# Patient Record
Sex: MALE | URBAN - METROPOLITAN AREA
[De-identification: names, ages, dates, MRNs, and addresses within clinical notes are randomized per-mention and may not be internally consistent; named-entity substitution may affect disease eponyms.]

---

## 2018-07-02 ENCOUNTER — APPOINTMENT (RX ONLY)
Dept: URBAN - METROPOLITAN AREA CLINIC 36 | Facility: CLINIC | Age: 53
Setting detail: DERMATOLOGY
End: 2018-07-02

## 2018-07-02 DIAGNOSIS — D485 NEOPLASM OF UNCERTAIN BEHAVIOR OF SKIN: ICD-10-CM

## 2018-07-02 DIAGNOSIS — L20.89 OTHER ATOPIC DERMATITIS: ICD-10-CM

## 2018-07-02 DIAGNOSIS — D22 MELANOCYTIC NEVI: ICD-10-CM

## 2018-07-02 DIAGNOSIS — L82.1 OTHER SEBORRHEIC KERATOSIS: ICD-10-CM

## 2018-07-02 DIAGNOSIS — D18.0 HEMANGIOMA: ICD-10-CM

## 2018-07-02 DIAGNOSIS — L81.4 OTHER MELANIN HYPERPIGMENTATION: ICD-10-CM

## 2018-07-02 PROBLEM — D22.9 MELANOCYTIC NEVI, UNSPECIFIED: Status: ACTIVE | Noted: 2018-07-02

## 2018-07-02 PROBLEM — D18.01 HEMANGIOMA OF SKIN AND SUBCUTANEOUS TISSUE: Status: ACTIVE | Noted: 2018-07-02

## 2018-07-02 PROBLEM — L20.84 INTRINSIC (ALLERGIC) ECZEMA: Status: ACTIVE | Noted: 2018-07-02

## 2018-07-02 PROBLEM — D48.5 NEOPLASM OF UNCERTAIN BEHAVIOR OF SKIN: Status: ACTIVE | Noted: 2018-07-02

## 2018-07-02 PROCEDURE — 11101: CPT

## 2018-07-02 PROCEDURE — 99202 OFFICE O/P NEW SF 15 MIN: CPT | Mod: 25

## 2018-07-02 PROCEDURE — ? COUNSELING

## 2018-07-02 PROCEDURE — ? COUNSELING: TOPICAL STEROIDS

## 2018-07-02 PROCEDURE — ? BIOPSY BY SHAVE METHOD

## 2018-07-02 PROCEDURE — ? PRESCRIPTION

## 2018-07-02 PROCEDURE — 11100: CPT

## 2018-07-02 RX ORDER — CLOBETASOL PROPIONATE 0.5 MG/G
CREAM TOPICAL
Qty: 1 | Refills: 11 | Status: ERX | COMMUNITY
Start: 2018-07-02

## 2018-07-02 RX ADMIN — CLOBETASOL PROPIONATE: 0.5 CREAM TOPICAL at 00:00

## 2018-07-02 ASSESSMENT — LOCATION SIMPLE DESCRIPTION DERM
LOCATION SIMPLE: ABDOMEN
LOCATION SIMPLE: RIGHT LOWER BACK
LOCATION SIMPLE: LEFT FOREARM
LOCATION SIMPLE: RIGHT FOREARM
LOCATION SIMPLE: RIGHT CHEEK
LOCATION SIMPLE: CHEST
LOCATION SIMPLE: LEFT UPPER BACK

## 2018-07-02 ASSESSMENT — LOCATION DETAILED DESCRIPTION DERM
LOCATION DETAILED: LEFT SUPERIOR MEDIAL UPPER BACK
LOCATION DETAILED: RIGHT MEDIAL SUPERIOR CHEST
LOCATION DETAILED: EPIGASTRIC SKIN
LOCATION DETAILED: LEFT INFERIOR MEDIAL UPPER BACK
LOCATION DETAILED: STERNAL NOTCH
LOCATION DETAILED: SUBXIPHOID
LOCATION DETAILED: LEFT DISTAL DORSAL FOREARM
LOCATION DETAILED: RIGHT INFERIOR MEDIAL MIDBACK
LOCATION DETAILED: RIGHT VENTRAL PROXIMAL FOREARM
LOCATION DETAILED: RIGHT SUPERIOR NASAL CHEEK
LOCATION DETAILED: LEFT VENTRAL LATERAL PROXIMAL FOREARM

## 2018-07-02 ASSESSMENT — LOCATION ZONE DERM
LOCATION ZONE: TRUNK
LOCATION ZONE: ARM
LOCATION ZONE: FACE

## 2018-07-02 NOTE — PROCEDURE: BIOPSY BY SHAVE METHOD
Anesthesia Volume In Cc: 0.5
Lab: [id number]
Additional Anesthesia Volume In Cc (Will Not Render If 0): 0
Bill For Surgical Tray: no
Silver Nitrate Text: The wound bed was treated with silver nitrate after the biopsy was performed.
Render Post-Care Instructions In Note?: yes
Post-Care Instructions: I reviewed with the patient in detail post-care instructions. Patient is to keep the biopsy site dry overnight, and then apply bacitracin twice daily until healed. Patient may apply hydrogen peroxide soaks to remove any crusting.
Billing Type: Third-Party Bill
Consent: Written consent was obtained and risks were reviewed including but not limited to scarring, infection, bleeding, scabbing, incomplete removal, nerve damage and allergy to anesthesia.
Detail Level: Detailed
Electrodesiccation Text: The wound bed was treated with electrodesiccation after the biopsy was performed.
Cryotherapy Text: The wound bed was treated with cryotherapy after the biopsy was performed.
Biopsy Method: Personna blade
Biopsy Type: H and E
Anesthesia Type: 1% lidocaine with epinephrine
Dressing: bandage
Notification Instructions: Patient will be notified of biopsy results. However, patient instructed to call the office if not contacted within 2 weeks.
Electrodesiccation And Curettage Text: The wound bed was treated with electrodesiccation and curettage after the biopsy was performed.
Curettage Text: The wound bed was treated with curettage after the biopsy was performed.
Hemostasis: Drysol
Lab Facility: 
Depth Of Biopsy: dermis
Type Of Destruction Used: Curettage
Wound Care: Vaseline

## 2018-07-02 NOTE — HPI: SECONDARY COMPLAINT
How Severe Is This Condition?: moderate
Additional History: Worse in the winter time. Also has some scaling on his left axilla

## 2018-07-19 ENCOUNTER — APPOINTMENT (RX ONLY)
Dept: URBAN - METROPOLITAN AREA CLINIC 36 | Facility: CLINIC | Age: 53
Setting detail: DERMATOLOGY
End: 2018-07-19

## 2018-07-19 DIAGNOSIS — L57.0 ACTINIC KERATOSIS: ICD-10-CM

## 2018-07-19 DIAGNOSIS — D22 MELANOCYTIC NEVI: ICD-10-CM

## 2018-07-19 PROBLEM — D22.5 MELANOCYTIC NEVI OF TRUNK: Status: ACTIVE | Noted: 2018-07-19

## 2018-07-19 PROBLEM — Z85.828 PERSONAL HISTORY OF OTHER MALIGNANT NEOPLASM OF SKIN: Status: ACTIVE | Noted: 2018-07-19

## 2018-07-19 PROBLEM — C44.519 BASAL CELL CARCINOMA OF SKIN OF OTHER PART OF TRUNK: Status: ACTIVE | Noted: 2018-07-19

## 2018-07-19 PROCEDURE — ? COUNSELING

## 2018-07-19 PROCEDURE — 17260 DSTRJ MAL LES T/A/L 0.5 CM/<: CPT

## 2018-07-19 PROCEDURE — 11402 EXC TR-EXT B9+MARG 1.1-2 CM: CPT

## 2018-07-19 PROCEDURE — 99212 OFFICE O/P EST SF 10 MIN: CPT | Mod: 25

## 2018-07-19 PROCEDURE — ? EXCISION

## 2018-07-19 PROCEDURE — ? CURETTAGE AND DESTRUCTION

## 2018-07-19 ASSESSMENT — LOCATION SIMPLE DESCRIPTION DERM
LOCATION SIMPLE: RIGHT LOWER BACK
LOCATION SIMPLE: RIGHT LOWER BACK

## 2018-07-19 ASSESSMENT — LOCATION DETAILED DESCRIPTION DERM
LOCATION DETAILED: RIGHT INFERIOR MEDIAL MIDBACK
LOCATION DETAILED: RIGHT INFERIOR MEDIAL MIDBACK

## 2018-07-19 ASSESSMENT — LOCATION ZONE DERM
LOCATION ZONE: TRUNK
LOCATION ZONE: TRUNK

## 2018-07-19 NOTE — PROCEDURE: CURETTAGE AND DESTRUCTION
Size Of Lesion After Curettage: 0.5
Number Of Curettages: 3
What Was Performed First?: Curettage
Bill For Surgical Tray: no
Detail Level: Detailed
Cautery Type: electrodesiccation
Concentration (Mg/Ml Or Millions Of Plaque Forming Units/Cc): 0.01
Additional Information: (Optional): The wound was cleaned, and a pressure dressing was applied.  The patient received detailed post-op instructions.
Anesthesia Type: 1% lidocaine with epinephrine
Total Volume (Ccs): 1
Bill As A Line Item Or As Units: Line Item
Post-Care Instructions: I reviewed with the patient in detail post-care instructions. Patient is to keep the area dry for 48 hours, and not to engage in any swimming until the area is healed. Should the patient develop any fevers, chills, bleeding, severe pain patient will contact the office immediately.
Consent was obtained from the patient. The risks, benefits and alternatives to therapy were discussed in detail and opted to change to EDC from mohs given the small size of this lesion on curretting for first layer of mohs. Specifically, the risks of infection, scarring, bleeding, prolonged wound healing, nerve injury, incomplete removal, allergy to anesthesia and recurrence were addressed. Alternatives to ED&C, such as: surgical removal and Mohs also discussed.  Prior to the procedure, the treatment site was clearly identified and confirmed by the patient. All components of Universal Protocol/PAUSE Rule completed.
Size Of Lesion In Cm: 0.3

## 2018-07-19 NOTE — PROCEDURE: COUNSELING
Detail Level: Detailed
Patient Specific Counseling (Will Not Stick From Patient To Patient): consider full face fraxel in the fall

## 2018-07-19 NOTE — PROCEDURE: EXCISION
Complex Repair And W Plasty Text: The defect edges were debeveled with a #15 scalpel blade.  The primary defect was closed partially with a complex linear closure.  Given the location of the remaining defect, shape of the defect and the proximity to free margins a W plasty was deemed most appropriate for complete closure of the defect.  Using a sterile surgical marker, an appropriate advancement flap was drawn incorporating the defect and placing the expected incisions within the relaxed skin tension lines where possible.    The area thus outlined was incised deep to adipose tissue with a #15 scalpel blade.  The skin margins were undermined to an appropriate distance in all directions utilizing iris scissors.
Complex Repair And Double Advancement Flap Text: The defect edges were debeveled with a #15 scalpel blade.  The primary defect was closed partially with a complex linear closure.  Given the location of the remaining defect, shape of the defect and the proximity to free margins a double advancement flap was deemed most appropriate for complete closure of the defect.  Using a sterile surgical marker, an appropriate advancement flap was drawn incorporating the defect and placing the expected incisions within the relaxed skin tension lines where possible.    The area thus outlined was incised deep to adipose tissue with a #15 scalpel blade.  The skin margins were undermined to an appropriate distance in all directions utilizing iris scissors.
Dermal Autograft Text: The defect edges were debeveled with a #15 scalpel blade.  Given the location of the defect, shape of the defect and the proximity to free margins a dermal autograft was deemed most appropriate.  Using a sterile surgical marker, the primary defect shape was transferred to the donor site. The area thus outlined was incised deep to adipose tissue with a #15 scalpel blade.  The harvested graft was then trimmed of adipose and epidermal tissue until only dermis was left.  The skin graft was then placed in the primary defect and oriented appropriately.
Crescentic Advancement Flap Text: The defect edges were debeveled with a #15 scalpel blade.  Given the location of the defect and the proximity to free margins a crescentic advancement flap was deemed most appropriate.  Using a sterile surgical marker, the appropriate advancement flap was drawn incorporating the defect and placing the expected incisions within the relaxed skin tension lines where possible.    The area thus outlined was incised deep to adipose tissue with a #15 scalpel blade.  The skin margins were undermined to an appropriate distance in all directions utilizing iris scissors.
Complex Repair And O-L Flap Text: The defect edges were debeveled with a #15 scalpel blade.  The primary defect was closed partially with a complex linear closure.  Given the location of the remaining defect, shape of the defect and the proximity to free margins an O-L flap was deemed most appropriate for complete closure of the defect.  Using a sterile surgical marker, an appropriate flap was drawn incorporating the defect and placing the expected incisions within the relaxed skin tension lines where possible.    The area thus outlined was incised deep to adipose tissue with a #15 scalpel blade.  The skin margins were undermined to an appropriate distance in all directions utilizing iris scissors.
S Plasty Text: Given the location and shape of the defect, and the orientation of relaxed skin tension lines, an S-plasty was deemed most appropriate for repair.  Using a sterile surgical marker, the appropriate outline of the S-plasty was drawn, incorporating the defect and placing the expected incisions within the relaxed skin tension lines where possible.  The area thus outlined was incised deep to adipose tissue with a #15 scalpel blade.  The skin margins were undermined to an appropriate distance in all directions utilizing iris scissors. The skin flaps were advanced over the defect.  The opposing margins were then approximated with interrupted buried subcutaneous sutures.
Show Accession Variable: Yes
Post-Care Instructions: I reviewed with the patient in detail post-care instructions. Patient is not to engage in any heavy lifting, exercise, or swimming for the next 14 days. Should the patient develop any fevers, chills, bleeding, severe pain patient will contact the office immediately.
O-Z Plasty Text: The defect edges were debeveled with a #15 scalpel blade.  Given the location of the defect, shape of the defect and the proximity to free margins an O-Z plasty (double transposition flap) was deemed most appropriate.  Using a sterile surgical marker, the appropriate transposition flaps were drawn incorporating the defect and placing the expected incisions within the relaxed skin tension lines where possible.    The area thus outlined was incised deep to adipose tissue with a #15 scalpel blade.  The skin margins were undermined to an appropriate distance in all directions utilizing iris scissors.  Hemostasis was achieved with electrocautery.  The flaps were then transposed into place, one clockwise and the other counterclockwise, and anchored with interrupted buried subcutaneous sutures.
Excisional Biopsy Additional Text (Leave Blank If You Do Not Want): The margin was drawn around the clinically apparent lesion. An elliptical shape was then drawn on the skin incorporating the lesion and margins.  Incisions were then made along these lines to the appropriate tissue plane and the lesion was extirpated.
Complex Repair And Transposition Flap Text: The defect edges were debeveled with a #15 scalpel blade.  The primary defect was closed partially with a complex linear closure.  Given the location of the remaining defect, shape of the defect and the proximity to free margins a transposition flap was deemed most appropriate for complete closure of the defect.  Using a sterile surgical marker, an appropriate advancement flap was drawn incorporating the defect and placing the expected incisions within the relaxed skin tension lines where possible.    The area thus outlined was incised deep to adipose tissue with a #15 scalpel blade.  The skin margins were undermined to an appropriate distance in all directions utilizing iris scissors.
Graft Donor Site Bandage (Optional-Leave Blank If You Don't Want In Note): Steri-strips and a pressure bandage were applied to the donor site.
Cheek-To-Nose Interpolation Flap Text: A decision was made to reconstruct the defect utilizing an interpolation axial flap and a staged reconstruction.  A telfa template was made of the defect.  This telfa template was then used to outline the Cheek-To-Nose Interpolation flap.  The donor area for the pedicle flap was then injected with anesthesia.  The flap was excised through the skin and subcutaneous tissue down to the layer of the underlying musculature.  The interpolation flap was carefully excised within this deep plane to maintain its blood supply.  The edges of the donor site were undermined.   The donor site was closed in a primary fashion.  The pedicle was then rotated into position and sutured.  Once the tube was sutured into place, adequate blood supply was confirmed with blanching and refill.  The pedicle was then wrapped with xeroform gauze and dressed appropriately with a telfa and gauze bandage to ensure continued blood supply and protect the attached pedicle.
Muscle Hinge Flap Text: The defect edges were debeveled with a #15 scalpel blade.  Given the size, depth and location of the defect and the proximity to free margins a muscle hinge flap was deemed most appropriate.  Using a sterile surgical marker, an appropriate hinge flap was drawn incorporating the defect. The area thus outlined was incised with a #15 scalpel blade.  The skin margins were undermined to an appropriate distance in all directions utilizing iris scissors.
Intermediate Repair Preamble Text (Leave Blank If You Do Not Want): Undermining was performed with blunt dissection.
Secondary Defect Width (In Cm): 0
Complex Repair And Double M Plasty Text: The defect edges were debeveled with a #15 scalpel blade.  The primary defect was closed partially with a complex linear closure.  Given the location of the remaining defect, shape of the defect and the proximity to free margins a double M plasty was deemed most appropriate for complete closure of the defect.  Using a sterile surgical marker, an appropriate advancement flap was drawn incorporating the defect and placing the expected incisions within the relaxed skin tension lines where possible.    The area thus outlined was incised deep to adipose tissue with a #15 scalpel blade.  The skin margins were undermined to an appropriate distance in all directions utilizing iris scissors.
Rhombic Flap Text: The defect edges were debeveled with a #15 scalpel blade.  Given the location of the defect and the proximity to free margins a rhombic flap was deemed most appropriate.  Using a sterile surgical marker, an appropriate rhombic flap was drawn incorporating the defect.    The area thus outlined was incised deep to adipose tissue with a #15 scalpel blade.  The skin margins were undermined to an appropriate distance in all directions utilizing iris scissors.
Epidermal Sutures: 4-0 Ethilon
Scalpel Size: 15 blade
Cartilage Graft Text: The defect edges were debeveled with a #15 scalpel blade.  Given the location of the defect, shape of the defect, the fact the defect involved a full thickness cartilage defect a cartilage graft was deemed most appropriate.  An appropriate donor site was identified, cleansed, and anesthetized. The cartilage graft was then harvested and transferred to the recipient site, oriented appropriately and then sutured into place.  The secondary defect was then repaired using a primary closure.
Complex Repair And Single Advancement Flap Text: The defect edges were debeveled with a #15 scalpel blade.  The primary defect was closed partially with a complex linear closure.  Given the location of the remaining defect, shape of the defect and the proximity to free margins a single advancement flap was deemed most appropriate for complete closure of the defect.  Using a sterile surgical marker, an appropriate advancement flap was drawn incorporating the defect and placing the expected incisions within the relaxed skin tension lines where possible.    The area thus outlined was incised deep to adipose tissue with a #15 scalpel blade.  The skin margins were undermined to an appropriate distance in all directions utilizing iris scissors.
Add Nub Associated Diagnoses If Applicable When Selecting Medical Necessity: No
Advancement Flap (Single) Text: The defect edges were debeveled with a #15 scalpel blade.  Given the location of the defect and the proximity to free margins a single advancement flap was deemed most appropriate.  Using a sterile surgical marker, an appropriate advancement flap was drawn incorporating the defect and placing the expected incisions within the relaxed skin tension lines where possible.    The area thus outlined was incised deep to adipose tissue with a #15 scalpel blade.  The skin margins were undermined to an appropriate distance in all directions utilizing iris scissors.
Complex Repair And Dermal Autograft Text: The defect edges were debeveled with a #15 scalpel blade.  The primary defect was closed partially with a complex linear closure.  Given the location of the defect, shape of the defect and the proximity to free margins an dermal autograft was deemed most appropriate to repair the remaining defect.  The graft was trimmed to fit the size of the remaining defect.  The graft was then placed in the primary defect, oriented appropriately, and sutured into place.
Complex Repair And Epidermal Autograft Text: The defect edges were debeveled with a #15 scalpel blade.  The primary defect was closed partially with a complex linear closure.  Given the location of the defect, shape of the defect and the proximity to free margins an epidermal autograft was deemed most appropriate to repair the remaining defect.  The graft was trimmed to fit the size of the remaining defect.  The graft was then placed in the primary defect, oriented appropriately, and sutured into place.
Medical Necessity Clause: This procedure was medically necessary because the lesion that was treated was:
Consent was obtained from the patient. The risks and benefits to therapy were discussed in detail. Specifically, the risks of infection, scarring, bleeding, prolonged wound healing, incomplete removal, allergy to anesthesia, nerve injury and recurrence were addressed. Prior to the procedure, the treatment site was clearly identified and confirmed by the patient. All components of Universal Protocol/PAUSE Rule completed.
Complex Repair And Modified Advancement Flap Text: The defect edges were debeveled with a #15 scalpel blade.  The primary defect was closed partially with a complex linear closure.  Given the location of the remaining defect, shape of the defect and the proximity to free margins a modified advancement flap was deemed most appropriate for complete closure of the defect.  Using a sterile surgical marker, an appropriate advancement flap was drawn incorporating the defect and placing the expected incisions within the relaxed skin tension lines where possible.    The area thus outlined was incised deep to adipose tissue with a #15 scalpel blade.  The skin margins were undermined to an appropriate distance in all directions utilizing iris scissors.
Bilobed Flap Text: The defect edges were debeveled with a #15 scalpel blade.  Given the location of the defect and the proximity to free margins a bilobe flap was deemed most appropriate.  Using a sterile surgical marker, an appropriate bilobe flap drawn around the defect.    The area thus outlined was incised deep to adipose tissue with a #15 scalpel blade.  The skin margins were undermined to an appropriate distance in all directions utilizing iris scissors.
Complex Repair And Rhombic Flap Text: The defect edges were debeveled with a #15 scalpel blade.  The primary defect was closed partially with a complex linear closure.  Given the location of the remaining defect, shape of the defect and the proximity to free margins a rhombic flap was deemed most appropriate for complete closure of the defect.  Using a sterile surgical marker, an appropriate advancement flap was drawn incorporating the defect and placing the expected incisions within the relaxed skin tension lines where possible.    The area thus outlined was incised deep to adipose tissue with a #15 scalpel blade.  The skin margins were undermined to an appropriate distance in all directions utilizing iris scissors.
Hemostasis: Electrocautery
Complex Repair And Tissue Cultured Epidermal Autograft Text: The defect edges were debeveled with a #15 scalpel blade.  The primary defect was closed partially with a complex linear closure.  Given the location of the defect, shape of the defect and the proximity to free margins an tissue cultured epidermal autograft was deemed most appropriate to repair the remaining defect.  The graft was trimmed to fit the size of the remaining defect.  The graft was then placed in the primary defect, oriented appropriately, and sutured into place.
V-Y Plasty Text: The defect edges were debeveled with a #15 scalpel blade.  Given the location of the defect, shape of the defect and the proximity to free margins an V-Y advancement flap was deemed most appropriate.  Using a sterile surgical marker, an appropriate advancement flap was drawn incorporating the defect and placing the expected incisions within the relaxed skin tension lines where possible.    The area thus outlined was incised deep to adipose tissue with a #15 scalpel blade.  The skin margins were undermined to an appropriate distance in all directions utilizing iris scissors.
Path Notes (To The Dermatopathologist): Please check margins.
Advancement Flap (Double) Text: The defect edges were debeveled with a #15 scalpel blade.  Given the location of the defect and the proximity to free margins a double advancement flap was deemed most appropriate.  Using a sterile surgical marker, the appropriate advancement flaps were drawn incorporating the defect and placing the expected incisions within the relaxed skin tension lines where possible.    The area thus outlined was incised deep to adipose tissue with a #15 scalpel blade.  The skin margins were undermined to an appropriate distance in all directions utilizing iris scissors.
Skin Substitute Text: The defect edges were debeveled with a #15 scalpel blade.  Given the location of the defect, shape of the defect and the proximity to free margins a skin substitute graft was deemed most appropriate.  The graft material was trimmed to fit the size of the defect. The graft was then placed in the primary defect and oriented appropriately.
Complex Repair And Bilobe Flap Text: The defect edges were debeveled with a #15 scalpel blade.  The primary defect was closed partially with a complex linear closure.  Given the location of the remaining defect, shape of the defect and the proximity to free margins a bilobe flap was deemed most appropriate for complete closure of the defect.  Using a sterile surgical marker, an appropriate advancement flap was drawn incorporating the defect and placing the expected incisions within the relaxed skin tension lines where possible.    The area thus outlined was incised deep to adipose tissue with a #15 scalpel blade.  The skin margins were undermined to an appropriate distance in all directions utilizing iris scissors.
Complex Repair And O-T Advancement Flap Text: The defect edges were debeveled with a #15 scalpel blade.  The primary defect was closed partially with a complex linear closure.  Given the location of the remaining defect, shape of the defect and the proximity to free margins an O-T advancement flap was deemed most appropriate for complete closure of the defect.  Using a sterile surgical marker, an appropriate advancement flap was drawn incorporating the defect and placing the expected incisions within the relaxed skin tension lines where possible.    The area thus outlined was incised deep to adipose tissue with a #15 scalpel blade.  The skin margins were undermined to an appropriate distance in all directions utilizing iris scissors.
Island Pedicle Flap With Canthal Suspension Text: The defect edges were debeveled with a #15 scalpel blade.  Given the location of the defect, shape of the defect and the proximity to free margins an island pedicle advancement flap was deemed most appropriate.  Using a sterile surgical marker, an appropriate advancement flap was drawn incorporating the defect, outlining the appropriate donor tissue and placing the expected incisions within the relaxed skin tension lines where possible. The area thus outlined was incised deep to adipose tissue with a #15 scalpel blade.  The skin margins were undermined to an appropriate distance in all directions around the primary defect and laterally outward around the island pedicle utilizing iris scissors.  There was minimal undermining beneath the pedicle flap. A suspension suture was placed in the canthal tendon to prevent tension and prevent ectropion.
Complex Repair And M Plasty Text: The defect edges were debeveled with a #15 scalpel blade.  The primary defect was closed partially with a complex linear closure.  Given the location of the remaining defect, shape of the defect and the proximity to free margins an M plasty was deemed most appropriate for complete closure of the defect.  Using a sterile surgical marker, an appropriate advancement flap was drawn incorporating the defect and placing the expected incisions within the relaxed skin tension lines where possible.    The area thus outlined was incised deep to adipose tissue with a #15 scalpel blade.  The skin margins were undermined to an appropriate distance in all directions utilizing iris scissors.
Mucosal Advancement Flap Text: Given the location of the defect, shape of the defect and the proximity to free margins a mucosal advancement flap was deemed most appropriate. Incisions were made with a 15 blade scalpel in the appropriate fashion along the cutaneous vermillion border and the mucosal lip. The remaining actinically damaged mucosal tissue was excised.  The mucosal advancement flap was then elevated to the gingival sulcus with care taken to preserve the neurovascular structures and advanced into the primary defect. Care was taken to ensure that precise realignment of the vermilion border was achieved.
V-Y Flap Text: The defect edges were debeveled with a #15 scalpel blade.  Given the location of the defect, shape of the defect and the proximity to free margins a V-Y flap was deemed most appropriate.  Using a sterile surgical marker, an appropriate advancement flap was drawn incorporating the defect and placing the expected incisions within the relaxed skin tension lines where possible.    The area thus outlined was incised deep to adipose tissue with a #15 scalpel blade.  The skin margins were undermined to an appropriate distance in all directions utilizing iris scissors.
Lab: [id number]
Melolabial Transposition Flap Text: The defect edges were debeveled with a #15 scalpel blade.  Given the location of the defect and the proximity to free margins a melolabial flap was deemed most appropriate.  Using a sterile surgical marker, an appropriate melolabial transposition flap was drawn incorporating the defect.    The area thus outlined was incised deep to adipose tissue with a #15 scalpel blade.  The skin margins were undermined to an appropriate distance in all directions utilizing iris scissors.
Lazy S Complex Repair Preamble Text (Leave Blank If You Do Not Want): Extensive wide undermining was performed.
Dressing: dry sterile dressing
Home Suture Removal Text: Patient was provided a home suture removal kit and will remove their sutures at home.  If they have any questions or difficulties they will call the office.
Dorsal Nasal Flap Text: The defect edges were debeveled with a #15 scalpel blade.  Given the location of the defect and the proximity to free margins a dorsal nasal flap was deemed most appropriate.  Using a sterile surgical marker, an appropriate dorsal nasal flap was drawn around the defect.    The area thus outlined was incised deep to adipose tissue with a #15 scalpel blade.  The skin margins were undermined to an appropriate distance in all directions utilizing iris scissors.
Slit Excision Additional Text (Leave Blank If You Do Not Want): A linear line was drawn on the skin overlying the lesion. An incision was made slowly until the lesion was visualized.  Once visualized, the lesion was removed with blunt dissection.
Spiral Flap Text: The defect edges were debeveled with a #15 scalpel blade.  Given the location of the defect, shape of the defect and the proximity to free margins a spiral flap was deemed most appropriate.  Using a sterile surgical marker, an appropriate rotation flap was drawn incorporating the defect and placing the expected incisions within the relaxed skin tension lines where possible. The area thus outlined was incised deep to adipose tissue with a #15 scalpel blade.  The skin margins were undermined to an appropriate distance in all directions utilizing iris scissors.
Cheek Interpolation Flap Text: A decision was made to reconstruct the defect utilizing an interpolation axial flap and a staged reconstruction.  A telfa template was made of the defect.  This telfa template was then used to outline the Cheek Interpolation flap.  The donor area for the pedicle flap was then injected with anesthesia.  The flap was excised through the skin and subcutaneous tissue down to the layer of the underlying musculature.  The interpolation flap was carefully excised within this deep plane to maintain its blood supply.  The edges of the donor site were undermined.   The donor site was closed in a primary fashion.  The pedicle was then rotated into position and sutured.  Once the tube was sutured into place, adequate blood supply was confirmed with blanching and refill.  The pedicle was then wrapped with xeroform gauze and dressed appropriately with a telfa and gauze bandage to ensure continued blood supply and protect the attached pedicle.
Island Pedicle Flap Text: The defect edges were debeveled with a #15 scalpel blade.  Given the location of the defect, shape of the defect and the proximity to free margins an island pedicle advancement flap was deemed most appropriate.  Using a sterile surgical marker, an appropriate advancement flap was drawn incorporating the defect, outlining the appropriate donor tissue and placing the expected incisions within the relaxed skin tension lines where possible.    The area thus outlined was incised deep to adipose tissue with a #15 scalpel blade.  The skin margins were undermined to an appropriate distance in all directions around the primary defect and laterally outward around the island pedicle utilizing iris scissors.  There was minimal undermining beneath the pedicle flap.
Purse String (Simple) Text: Given the location of the defect and the characteristics of the surrounding skin a purse string simple closure was deemed most appropriate.  Undermining was performed circumferentially around the surgical defect.  A purse string suture was then placed and tightened.
Repair Performed By Another Provider Text (Leave Blank If You Do Not Want): After the tissue was excised the defect was repaired by another provider.
Complex Repair And Ftsg Text: The defect edges were debeveled with a #15 scalpel blade.  The primary defect was closed partially with a complex linear closure.  Given the location of the defect, shape of the defect and the proximity to free margins a full thickness skin graft was deemed most appropriate to repair the remaining defect.  The graft was trimmed to fit the size of the remaining defect.  The graft was then placed in the primary defect, oriented appropriately, and sutured into place.
Complex Repair And A-T Advancement Flap Text: The defect edges were debeveled with a #15 scalpel blade.  The primary defect was closed partially with a complex linear closure.  Given the location of the remaining defect, shape of the defect and the proximity to free margins an A-T advancement flap was deemed most appropriate for complete closure of the defect.  Using a sterile surgical marker, an appropriate advancement flap was drawn incorporating the defect and placing the expected incisions within the relaxed skin tension lines where possible.    The area thus outlined was incised deep to adipose tissue with a #15 scalpel blade.  The skin margins were undermined to an appropriate distance in all directions utilizing iris scissors.
Posterior Auricular Interpolation Flap Text: A decision was made to reconstruct the defect utilizing an interpolation axial flap and a staged reconstruction.  A telfa template was made of the defect.  This telfa template was then used to outline the posterior auricular interpolation flap.  The donor area for the pedicle flap was then injected with anesthesia.  The flap was excised through the skin and subcutaneous tissue down to the layer of the underlying musculature.  The pedicle flap was carefully excised within this deep plane to maintain its blood supply.  The edges of the donor site were undermined.   The donor site was closed in a primary fashion.  The pedicle was then rotated into position and sutured.  Once the tube was sutured into place, adequate blood supply was confirmed with blanching and refill.  The pedicle was then wrapped with xeroform gauze and dressed appropriately with a telfa and gauze bandage to ensure continued blood supply and protect the attached pedicle.
Complex Repair And Split-Thickness Skin Graft Text: The defect edges were debeveled with a #15 scalpel blade.  The primary defect was closed partially with a complex linear closure.  Given the location of the defect, shape of the defect and the proximity to free margins a split thickness skin graft was deemed most appropriate to repair the remaining defect.  The graft was trimmed to fit the size of the remaining defect.  The graft was then placed in the primary defect, oriented appropriately, and sutured into place.
Paramedian Forehead Flap Text: A decision was made to reconstruct the defect utilizing an interpolation axial flap and a staged reconstruction.  A telfa template was made of the defect.  This telfa template was then used to outline the paramedian forehead pedicle flap.  The donor area for the pedicle flap was then injected with anesthesia.  The flap was excised through the skin and subcutaneous tissue down to the layer of the underlying musculature.  The pedicle flap was carefully excised within this deep plane to maintain its blood supply.  The edges of the donor site were undermined.   The donor site was closed in a primary fashion.  The pedicle was then rotated into position and sutured.  Once the tube was sutured into place, adequate blood supply was confirmed with blanching and refill.  The pedicle was then wrapped with xeroform gauze and dressed appropriately with a telfa and gauze bandage to ensure continued blood supply and protect the attached pedicle.
Modified Advancement Flap Text: The defect edges were debeveled with a #15 scalpel blade.  Given the location of the defect, shape of the defect and the proximity to free margins a modified advancement flap was deemed most appropriate.  Using a sterile surgical marker, an appropriate advancement flap was drawn incorporating the defect and placing the expected incisions within the relaxed skin tension lines where possible.    The area thus outlined was incised deep to adipose tissue with a #15 scalpel blade.  The skin margins were undermined to an appropriate distance in all directions utilizing iris scissors.
Size Of Lesion In Cm: 1
Composite Graft Text: The defect edges were debeveled with a #15 scalpel blade.  Given the location of the defect, shape of the defect, the proximity to free margins and the fact the defect was full thickness a composite graft was deemed most appropriate.  The defect was outline and then transferred to the donor site.  A full thickness graft was then excised from the donor site. The graft was then placed in the primary defect, oriented appropriately and then sutured into place.  The secondary defect was then repaired using a primary closure.
Complex Repair And Melolabial Flap Text: The defect edges were debeveled with a #15 scalpel blade.  The primary defect was closed partially with a complex linear closure.  Given the location of the remaining defect, shape of the defect and the proximity to free margins a melolabial flap was deemed most appropriate for complete closure of the defect.  Using a sterile surgical marker, an appropriate advancement flap was drawn incorporating the defect and placing the expected incisions within the relaxed skin tension lines where possible.    The area thus outlined was incised deep to adipose tissue with a #15 scalpel blade.  The skin margins were undermined to an appropriate distance in all directions utilizing iris scissors.
Ear Star Wedge Flap Text: The defect edges were debeveled with a #15 blade scalpel.  Given the location of the defect and the proximity to free margins (helical rim) an ear star wedge flap was deemed most appropriate.  Using a sterile surgical marker, the appropriate flap was drawn incorporating the defect and placing the expected incisions between the helical rim and antihelix where possible.  The area thus outlined was incised through and through with a #15 scalpel blade.
Billing Type: Third-Party Bill
Anesthesia Type: 1% lidocaine with epinephrine
Helical Rim Advancement Flap Text: The defect edges were debeveled with a #15 blade scalpel.  Given the location of the defect and the proximity to free margins (helical rim) a double helical rim advancement flap was deemed most appropriate.  Using a sterile surgical marker, the appropriate advancement flaps were drawn incorporating the defect and placing the expected incisions between the helical rim and antihelix where possible.  The area thus outlined was incised through and through with a #15 scalpel blade.  With a skin hook and iris scissors, the flaps were gently and sharply undermined and freed up.
Eliptical Excision Additional Text (Leave Blank If You Do Not Want): The margin was drawn around the clinically apparent lesion.  An elliptical shape was then drawn on the skin incorporating the lesion and margins.  Incisions were then made along these lines to the appropriate tissue plane and the lesion was extirpated.
Purse String (Intermediate) Text: Given the location of the defect and the characteristics of the surrounding skin a purse string intermediate closure was deemed most appropriate.  Undermining was performed circumferentially around the surgical defect.  A purse string suture was then placed and tightened.
Estimated Blood Loss (Cc): minimal
Complex Repair And Xenograft Text: The defect edges were debeveled with a #15 scalpel blade.  The primary defect was closed partially with a complex linear closure.  Given the location of the defect, shape of the defect and the proximity to free margins a xenograft was deemed most appropriate to repair the remaining defect.  The graft was trimmed to fit the size of the remaining defect.  The graft was then placed in the primary defect, oriented appropriately, and sutured into place.
Lip Wedge Excision Repair Text: Given the location of the defect and the proximity to free margins a full thickness wedge repair was deemed most appropriate.  Using a sterile surgical marker, the appropriate repair was drawn incorporating the defect and placing the expected incisions perpendicular to the vermilion border.  The vermilion border was also meticulously outlined to ensure appropriate reapproximation during the repair.  The area thus outlined was incised through and through with a #15 scalpel blade.  The muscularis and dermis were reaproximated with deep sutures following hemostasis. Care was taken to realign the vermilion border before proceeding with the superficial closure.  Once the vermilion was realigned the superfical and mucosal closure was finished.
Complex Repair And Skin Substitute Graft Text: The defect edges were debeveled with a #15 scalpel blade.  The primary defect was closed partially with a complex linear closure.  Given the location of the remaining defect, shape of the defect and the proximity to free margins a skin substitute graft was deemed most appropriate to repair the remaining defect.  The graft was trimmed to fit the size of the remaining defect.  The graft was then placed in the primary defect, oriented appropriately, and sutured into place.
Tissue Cultured Epidermal Autograft Text: The defect edges were debeveled with a #15 scalpel blade.  Given the location of the defect, shape of the defect and the proximity to free margins a tissue cultured epidermal autograft was deemed most appropriate.  The graft was then trimmed to fit the size of the defect.  The graft was then placed in the primary defect and oriented appropriately.
Size Of Margin In Cm: 0.2
Epidermal Closure Graft Donor Site (Optional): simple interrupted
Trilobed Flap Text: The defect edges were debeveled with a #15 scalpel blade.  Given the location of the defect and the proximity to free margins a trilobed flap was deemed most appropriate.  Using a sterile surgical marker, an appropriate trilobed flap drawn around the defect.    The area thus outlined was incised deep to adipose tissue with a #15 scalpel blade.  The skin margins were undermined to an appropriate distance in all directions utilizing iris scissors.
Medical Necessity Information: It is in your best interest to select a reason for this procedure from the list below. All of these items fulfill various CMS LCD requirements except lesion extends to a margin.
Keystone Flap Text: The defect edges were debeveled with a #15 scalpel blade.  Given the location of the defect, shape of the defect a keystone flap was deemed most appropriate.  Using a sterile surgical marker, an appropriate keystone flap was drawn incorporating the defect, outlining the appropriate donor tissue and placing the expected incisions within the relaxed skin tension lines where possible. The area thus outlined was incised deep to adipose tissue with a #15 scalpel blade.  The skin margins were undermined to an appropriate distance in all directions around the primary defect and laterally outward around the flap utilizing iris scissors.
Melolabial Interpolation Flap Text: A decision was made to reconstruct the defect utilizing an interpolation axial flap and a staged reconstruction.  A telfa template was made of the defect.  This telfa template was then used to outline the melolabial interpolation flap.  The donor area for the pedicle flap was then injected with anesthesia.  The flap was excised through the skin and subcutaneous tissue down to the layer of the underlying musculature.  The pedicle flap was carefully excised within this deep plane to maintain its blood supply.  The edges of the donor site were undermined.   The donor site was closed in a primary fashion.  The pedicle was then rotated into position and sutured.  Once the tube was sutured into place, adequate blood supply was confirmed with blanching and refill.  The pedicle was then wrapped with xeroform gauze and dressed appropriately with a telfa and gauze bandage to ensure continued blood supply and protect the attached pedicle.
Lab Facility: [id number]
Epidermal Autograft Text: The defect edges were debeveled with a #15 scalpel blade.  Given the location of the defect, shape of the defect and the proximity to free margins an epidermal autograft was deemed most appropriate.  Using a sterile surgical marker, the primary defect shape was transferred to the donor site. The epidermal graft was then harvested.  The skin graft was then placed in the primary defect and oriented appropriately.
Rotation Flap Text: The defect edges were debeveled with a #15 scalpel blade.  Given the location of the defect, shape of the defect and the proximity to free margins a rotation flap was deemed most appropriate.  Using a sterile surgical marker, an appropriate rotation flap was drawn incorporating the defect and placing the expected incisions within the relaxed skin tension lines where possible.    The area thus outlined was incised deep to adipose tissue with a #15 scalpel blade.  The skin margins were undermined to an appropriate distance in all directions utilizing iris scissors.
Mastoid Interpolation Flap Text: A decision was made to reconstruct the defect utilizing an interpolation axial flap and a staged reconstruction.  A telfa template was made of the defect.  This telfa template was then used to outline the mastoid interpolation flap.  The donor area for the pedicle flap was then injected with anesthesia.  The flap was excised through the skin and subcutaneous tissue down to the layer of the underlying musculature.  The pedicle flap was carefully excised within this deep plane to maintain its blood supply.  The edges of the donor site were undermined.   The donor site was closed in a primary fashion.  The pedicle was then rotated into position and sutured.  Once the tube was sutured into place, adequate blood supply was confirmed with blanching and refill.  The pedicle was then wrapped with xeroform gauze and dressed appropriately with a telfa and gauze bandage to ensure continued blood supply and protect the attached pedicle.
Star Wedge Flap Text: The defect edges were debeveled with a #15 scalpel blade.  Given the location of the defect, shape of the defect and the proximity to free margins a star wedge flap was deemed most appropriate.  Using a sterile surgical marker, an appropriate rotation flap was drawn incorporating the defect and placing the expected incisions within the relaxed skin tension lines where possible. The area thus outlined was incised deep to adipose tissue with a #15 scalpel blade.  The skin margins were undermined to an appropriate distance in all directions utilizing iris scissors.
O-L Flap Text: The defect edges were debeveled with a #15 scalpel blade.  Given the location of the defect, shape of the defect and the proximity to free margins an O-L flap was deemed most appropriate.  Using a sterile surgical marker, an appropriate advancement flap was drawn incorporating the defect and placing the expected incisions within the relaxed skin tension lines where possible.    The area thus outlined was incised deep to adipose tissue with a #15 scalpel blade.  The skin margins were undermined to an appropriate distance in all directions utilizing iris scissors.
No Repair - Repaired With Adjacent Surgical Defect Text (Leave Blank If You Do Not Want): After the excision the defect was repaired concurrently with another surgical defect which was in close approximation.
Bilateral Helical Rim Advancement Flap Text: The defect edges were debeveled with a #15 blade scalpel.  Given the location of the defect and the proximity to free margins (helical rim) a bilateral helical rim advancement flap was deemed most appropriate.  Using a sterile surgical marker, the appropriate advancement flaps were drawn incorporating the defect and placing the expected incisions between the helical rim and antihelix where possible.  The area thus outlined was incised through and through with a #15 scalpel blade.  With a skin hook and iris scissors, the flaps were gently and sharply undermined and freed up.
Complex Repair And Dorsal Nasal Flap Text: The defect edges were debeveled with a #15 scalpel blade.  The primary defect was closed partially with a complex linear closure.  Given the location of the remaining defect, shape of the defect and the proximity to free margins a dorsal nasal flap was deemed most appropriate for complete closure of the defect.  Using a sterile surgical marker, an appropriate flap was drawn incorporating the defect and placing the expected incisions within the relaxed skin tension lines where possible.    The area thus outlined was incised deep to adipose tissue with a #15 scalpel blade.  The skin margins were undermined to an appropriate distance in all directions utilizing iris scissors.
Detail Level: Detailed
Alar Island Pedicle Flap Text: The defect edges were debeveled with a #15 scalpel blade.  Given the location of the defect, shape of the defect and the proximity to the alar rim an island pedicle advancement flap was deemed most appropriate.  Using a sterile surgical marker, an appropriate advancement flap was drawn incorporating the defect, outlining the appropriate donor tissue and placing the expected incisions within the nasal ala running parallel to the alar rim. The area thus outlined was incised with a #15 scalpel blade.  The skin margins were undermined minimally to an appropriate distance in all directions around the primary defect and laterally outward around the island pedicle utilizing iris scissors.  There was minimal undermining beneath the pedicle flap.
Saucerization Excision Additional Text (Leave Blank If You Do Not Want): The margin was drawn around the clinically apparent lesion.  Incisions were then made along these lines, in a tangential fashion, to the appropriate tissue plane and the lesion was extirpated.
Complex Repair And V-Y Plasty Text: The defect edges were debeveled with a #15 scalpel blade.  The primary defect was closed partially with a complex linear closure.  Given the location of the remaining defect, shape of the defect and the proximity to free margins a V-Y plasty was deemed most appropriate for complete closure of the defect.  Using a sterile surgical marker, an appropriate advancement flap was drawn incorporating the defect and placing the expected incisions within the relaxed skin tension lines where possible.    The area thus outlined was incised deep to adipose tissue with a #15 scalpel blade.  The skin margins were undermined to an appropriate distance in all directions utilizing iris scissors.
Ftsg Text: The defect edges were debeveled with a #15 scalpel blade.  Given the location of the defect, shape of the defect and the proximity to free margins a full thickness skin graft was deemed most appropriate.  Using a sterile surgical marker, the primary defect shape was transferred to the donor site. The area thus outlined was incised deep to adipose tissue with a #15 scalpel blade.  The harvested graft was then trimmed of adipose tissue until only dermis and epidermis was left.  The skin margins of the secondary defect were undermined to an appropriate distance in all directions utilizing iris scissors.  The secondary defect was closed with interrupted buried subcutaneous sutures.  The skin edges were then re-apposed with running  sutures.  The skin graft was then placed in the primary defect and oriented appropriately.
Z Plasty Text: The lesion was extirpated to the level of the fat with a #15 scalpel blade.  Given the location of the defect, shape of the defect and the proximity to free margins a Z-plasty was deemed most appropriate for repair.  Using a sterile surgical marker, the appropriate transposition arms of the Z-plasty were drawn incorporating the defect and placing the expected incisions within the relaxed skin tension lines where possible.    The area thus outlined was incised deep to adipose tissue with a #15 scalpel blade.  The skin margins were undermined to an appropriate distance in all directions utilizing iris scissors.  The opposing transposition arms were then transposed into place in opposite direction and anchored with interrupted buried subcutaneous sutures.
W Plasty Text: The lesion was extirpated to the level of the fat with a #15 scalpel blade.  Given the location of the defect, shape of the defect and the proximity to free margins a W-plasty was deemed most appropriate for repair.  Using a sterile surgical marker, the appropriate transposition arms of the W-plasty were drawn incorporating the defect and placing the expected incisions within the relaxed skin tension lines where possible.    The area thus outlined was incised deep to adipose tissue with a #15 scalpel blade.  The skin margins were undermined to an appropriate distance in all directions utilizing iris scissors.  The opposing transposition arms were then transposed into place in opposite direction and anchored with interrupted buried subcutaneous sutures.
Burow's Advancement Flap Text: The defect edges were debeveled with a #15 scalpel blade.  Given the location of the defect and the proximity to free margins a Burow's advancement flap was deemed most appropriate.  Using a sterile surgical marker, the appropriate advancement flap was drawn incorporating the defect and placing the expected incisions within the relaxed skin tension lines where possible.    The area thus outlined was incised deep to adipose tissue with a #15 scalpel blade.  The skin margins were undermined to an appropriate distance in all directions utilizing iris scissors.
Fusiform Excision Additional Text (Leave Blank If You Do Not Want): The margin was drawn around the clinically apparent lesion.  A fusiform shape was then drawn on the skin incorporating the lesion and margins.  Incisions were then made along these lines to the appropriate tissue plane and the lesion was extirpated.
Bi-Rhombic Flap Text: The defect edges were debeveled with a #15 scalpel blade.  Given the location of the defect and the proximity to free margins a bi-rhombic flap was deemed most appropriate.  Using a sterile surgical marker, an appropriate rhombic flap was drawn incorporating the defect. The area thus outlined was incised deep to adipose tissue with a #15 scalpel blade.  The skin margins were undermined to an appropriate distance in all directions utilizing iris scissors.
Hatchet Flap Text: The defect edges were debeveled with a #15 scalpel blade.  Given the location of the defect, shape of the defect and the proximity to free margins a hatchet flap was deemed most appropriate.  Using a sterile surgical marker, an appropriate hatchet flap was drawn incorporating the defect and placing the expected incisions within the relaxed skin tension lines where possible.    The area thus outlined was incised deep to adipose tissue with a #15 scalpel blade.  The skin margins were undermined to an appropriate distance in all directions utilizing iris scissors.
Split-Thickness Skin Graft Text: The defect edges were debeveled with a #15 scalpel blade.  Given the location of the defect, shape of the defect and the proximity to free margins a split thickness skin graft was deemed most appropriate.  Using a sterile surgical marker, the primary defect shape was transferred to the donor site. The split thickness graft was then harvested.  The skin graft was then placed in the primary defect and oriented appropriately.
O-T Plasty Text: The defect edges were debeveled with a #15 scalpel blade.  Given the location of the defect, shape of the defect and the proximity to free margins an O-T plasty was deemed most appropriate.  Using a sterile surgical marker, an appropriate O-T plasty was drawn incorporating the defect and placing the expected incisions within the relaxed skin tension lines where possible.    The area thus outlined was incised deep to adipose tissue with a #15 scalpel blade.  The skin margins were undermined to an appropriate distance in all directions utilizing iris scissors.
Anesthesia Volume In Cc: 0.1
Double Island Pedicle Flap Text: The defect edges were debeveled with a #15 scalpel blade.  Given the location of the defect, shape of the defect and the proximity to free margins a double island pedicle advancement flap was deemed most appropriate.  Using a sterile surgical marker, an appropriate advancement flap was drawn incorporating the defect, outlining the appropriate donor tissue and placing the expected incisions within the relaxed skin tension lines where possible.    The area thus outlined was incised deep to adipose tissue with a #15 scalpel blade.  The skin margins were undermined to an appropriate distance in all directions around the primary defect and laterally outward around the island pedicle utilizing iris scissors.  There was minimal undermining beneath the pedicle flap.
Wound Care: Bacitracin
Complex Repair And Z Plasty Text: The defect edges were debeveled with a #15 scalpel blade.  The primary defect was closed partially with a complex linear closure.  Given the location of the remaining defect, shape of the defect and the proximity to free margins a Z plasty was deemed most appropriate for complete closure of the defect.  Using a sterile surgical marker, an appropriate advancement flap was drawn incorporating the defect and placing the expected incisions within the relaxed skin tension lines where possible.    The area thus outlined was incised deep to adipose tissue with a #15 scalpel blade.  The skin margins were undermined to an appropriate distance in all directions utilizing iris scissors.
Xenograft Text: The defect edges were debeveled with a #15 scalpel blade.  Given the location of the defect, shape of the defect and the proximity to free margins a xenograft was deemed most appropriate.  The graft was then trimmed to fit the size of the defect.  The graft was then placed in the primary defect and oriented appropriately.
Surgeon Performing Repair (Optional): Stefania Hollins
Complex Repair And Rotation Flap Text: The defect edges were debeveled with a #15 scalpel blade.  The primary defect was closed partially with a complex linear closure.  Given the location of the remaining defect, shape of the defect and the proximity to free margins a rotation flap was deemed most appropriate for complete closure of the defect.  Using a sterile surgical marker, an appropriate advancement flap was drawn incorporating the defect and placing the expected incisions within the relaxed skin tension lines where possible.    The area thus outlined was incised deep to adipose tissue with a #15 scalpel blade.  The skin margins were undermined to an appropriate distance in all directions utilizing iris scissors.
Transposition Flap Text: The defect edges were debeveled with a #15 scalpel blade.  Given the location of the defect and the proximity to free margins a transposition flap was deemed most appropriate.  Using a sterile surgical marker, an appropriate transposition flap was drawn incorporating the defect.    The area thus outlined was incised deep to adipose tissue with a #15 scalpel blade.  The skin margins were undermined to an appropriate distance in all directions utilizing iris scissors.
Additional Anesthesia Volume In Cc: 6
Interpolation Flap Text: A decision was made to reconstruct the defect utilizing an interpolation axial flap and a staged reconstruction.  A telfa template was made of the defect.  This telfa template was then used to outline the interpolation flap.  The donor area for the pedicle flap was then injected with anesthesia.  The flap was excised through the skin and subcutaneous tissue down to the layer of the underlying musculature.  The interpolation flap was carefully excised within this deep plane to maintain its blood supply.  The edges of the donor site were undermined.   The donor site was closed in a primary fashion.  The pedicle was then rotated into position and sutured.  Once the tube was sutured into place, adequate blood supply was confirmed with blanching and refill.  The pedicle was then wrapped with xeroform gauze and dressed appropriately with a telfa and gauze bandage to ensure continued blood supply and protect the attached pedicle.
Bilobed Transposition Flap Text: The defect edges were debeveled with a #15 scalpel blade.  Given the location of the defect and the proximity to free margins a bilobed transposition flap was deemed most appropriate.  Using a sterile surgical marker, an appropriate bilobe flap drawn around the defect.    The area thus outlined was incised deep to adipose tissue with a #15 scalpel blade.  The skin margins were undermined to an appropriate distance in all directions utilizing iris scissors.
Excision Depth: adipose tissue
Repair Type: None - Secondary Intention
Deep Sutures: 5-0 Vicryl
Perilesional Excision Additional Text (Leave Blank If You Do Not Want): The margin was drawn around the clinically apparent lesion. Incisions were then made along these lines to the appropriate tissue plane and the lesion was extirpated.
Island Pedicle Flap-Requiring Vessel Identification Text: The defect edges were debeveled with a #15 scalpel blade.  Given the location of the defect, shape of the defect and the proximity to free margins an island pedicle advancement flap was deemed most appropriate.  Using a sterile surgical marker, an appropriate advancement flap was drawn, based on the axial vessel mentioned above, incorporating the defect, outlining the appropriate donor tissue and placing the expected incisions within the relaxed skin tension lines where possible.    The area thus outlined was incised deep to adipose tissue with a #15 scalpel blade.  The skin margins were undermined to an appropriate distance in all directions around the primary defect and laterally outward around the island pedicle utilizing iris scissors.  There was minimal undermining beneath the pedicle flap.
H Plasty Text: Given the location of the defect, shape of the defect and the proximity to free margins a H-plasty was deemed most appropriate for repair.  Using a sterile surgical marker, the appropriate advancement arms of the H-plasty were drawn incorporating the defect and placing the expected incisions within the relaxed skin tension lines where possible. The area thus outlined was incised deep to adipose tissue with a #15 scalpel blade. The skin margins were undermined to an appropriate distance in all directions utilizing iris scissors.  The opposing advancement arms were then advanced into place in opposite direction and anchored with interrupted buried subcutaneous sutures.
Excision Method: Saucerization
A-T Advancement Flap Text: The defect edges were debeveled with a #15 scalpel blade.  Given the location of the defect, shape of the defect and the proximity to free margins an A-T advancement flap was deemed most appropriate.  Using a sterile surgical marker, an appropriate advancement flap was drawn incorporating the defect and placing the expected incisions within the relaxed skin tension lines where possible.    The area thus outlined was incised deep to adipose tissue with a #15 scalpel blade.  The skin margins were undermined to an appropriate distance in all directions utilizing iris scissors.
O-T Advancement Flap Text: The defect edges were debeveled with a #15 scalpel blade.  Given the location of the defect, shape of the defect and the proximity to free margins an O-T advancement flap was deemed most appropriate.  Using a sterile surgical marker, an appropriate advancement flap was drawn incorporating the defect and placing the expected incisions within the relaxed skin tension lines where possible.    The area thus outlined was incised deep to adipose tissue with a #15 scalpel blade.  The skin margins were undermined to an appropriate distance in all directions utilizing iris scissors.

## 2018-07-19 NOTE — HPI: PROCEDURE (ELECTRODESSICATION AND CURETTAGE)
Has The Growth Been Previously Biopsied?: has been previously biopsied
Additional History: Pt is unaware of how long this lesion has been there.
Family Member: Mother

## 2019-02-27 ENCOUNTER — APPOINTMENT (OUTPATIENT)
Dept: RADIOLOGY | Facility: MEDICAL CENTER | Age: 54
End: 2019-02-27
Attending: EMERGENCY MEDICINE
Payer: COMMERCIAL

## 2019-02-27 ENCOUNTER — HOSPITAL ENCOUNTER (EMERGENCY)
Facility: MEDICAL CENTER | Age: 54
End: 2019-02-27
Attending: EMERGENCY MEDICINE
Payer: COMMERCIAL

## 2019-02-27 VITALS
HEART RATE: 64 BPM | BODY MASS INDEX: 22.4 KG/M2 | RESPIRATION RATE: 18 BRPM | SYSTOLIC BLOOD PRESSURE: 115 MMHG | OXYGEN SATURATION: 98 % | HEIGHT: 71 IN | TEMPERATURE: 96.8 F | WEIGHT: 160 LBS | DIASTOLIC BLOOD PRESSURE: 74 MMHG

## 2019-02-27 DIAGNOSIS — R10.12 LEFT UPPER QUADRANT PAIN: ICD-10-CM

## 2019-02-27 DIAGNOSIS — R31.9 HEMATURIA, UNSPECIFIED TYPE: ICD-10-CM

## 2019-02-27 LAB
ALBUMIN SERPL BCP-MCNC: 4.8 G/DL (ref 3.2–4.9)
ALBUMIN/GLOB SERPL: 1.8 G/DL
ALP SERPL-CCNC: 65 U/L (ref 30–99)
ALT SERPL-CCNC: 92 U/L (ref 2–50)
ANION GAP SERPL CALC-SCNC: 14 MMOL/L (ref 0–11.9)
APPEARANCE UR: ABNORMAL
AST SERPL-CCNC: 48 U/L (ref 12–45)
BACTERIA #/AREA URNS HPF: NEGATIVE /HPF
BASOPHILS # BLD AUTO: 0.8 % (ref 0–1.8)
BASOPHILS # BLD: 0.05 K/UL (ref 0–0.12)
BILIRUB SERPL-MCNC: 1 MG/DL (ref 0.1–1.5)
BILIRUB UR QL STRIP.AUTO: NEGATIVE
BUN SERPL-MCNC: 15 MG/DL (ref 8–22)
CALCIUM SERPL-MCNC: 10.5 MG/DL (ref 8.5–10.5)
CHLORIDE SERPL-SCNC: 104 MMOL/L (ref 96–112)
CO2 SERPL-SCNC: 23 MMOL/L (ref 20–33)
COLOR UR: YELLOW
CREAT SERPL-MCNC: 1.23 MG/DL (ref 0.5–1.4)
EOSINOPHIL # BLD AUTO: 0.11 K/UL (ref 0–0.51)
EOSINOPHIL NFR BLD: 1.7 % (ref 0–6.9)
EPI CELLS #/AREA URNS HPF: NEGATIVE /HPF
ERYTHROCYTE [DISTWIDTH] IN BLOOD BY AUTOMATED COUNT: 42.3 FL (ref 35.9–50)
GLOBULIN SER CALC-MCNC: 2.7 G/DL (ref 1.9–3.5)
GLUCOSE SERPL-MCNC: 121 MG/DL (ref 65–99)
GLUCOSE UR STRIP.AUTO-MCNC: NEGATIVE MG/DL
HCT VFR BLD AUTO: 45.8 % (ref 42–52)
HGB BLD-MCNC: 15.6 G/DL (ref 14–18)
HYALINE CASTS #/AREA URNS LPF: ABNORMAL /LPF
IMM GRANULOCYTES # BLD AUTO: 0.03 K/UL (ref 0–0.11)
IMM GRANULOCYTES NFR BLD AUTO: 0.5 % (ref 0–0.9)
KETONES UR STRIP.AUTO-MCNC: NEGATIVE MG/DL
LEUKOCYTE ESTERASE UR QL STRIP.AUTO: ABNORMAL
LIPASE SERPL-CCNC: 34 U/L (ref 11–82)
LYMPHOCYTES # BLD AUTO: 1.98 K/UL (ref 1–4.8)
LYMPHOCYTES NFR BLD: 31.1 % (ref 22–41)
MCH RBC QN AUTO: 31.2 PG (ref 27–33)
MCHC RBC AUTO-ENTMCNC: 34.1 G/DL (ref 33.7–35.3)
MCV RBC AUTO: 91.6 FL (ref 81.4–97.8)
MICRO URNS: ABNORMAL
MONOCYTES # BLD AUTO: 0.47 K/UL (ref 0–0.85)
MONOCYTES NFR BLD AUTO: 7.4 % (ref 0–13.4)
NEUTROPHILS # BLD AUTO: 3.72 K/UL (ref 1.82–7.42)
NEUTROPHILS NFR BLD: 58.5 % (ref 44–72)
NITRITE UR QL STRIP.AUTO: NEGATIVE
NRBC # BLD AUTO: 0 K/UL
NRBC BLD-RTO: 0 /100 WBC
PH UR STRIP.AUTO: 6.5 [PH]
PLATELET # BLD AUTO: 273 K/UL (ref 164–446)
PMV BLD AUTO: 9.7 FL (ref 9–12.9)
POTASSIUM SERPL-SCNC: 3.4 MMOL/L (ref 3.6–5.5)
PROT SERPL-MCNC: 7.5 G/DL (ref 6–8.2)
PROT UR QL STRIP: NEGATIVE MG/DL
RBC # BLD AUTO: 5 M/UL (ref 4.7–6.1)
RBC # URNS HPF: >150 /HPF
RBC UR QL AUTO: ABNORMAL
SODIUM SERPL-SCNC: 141 MMOL/L (ref 135–145)
SP GR UR STRIP.AUTO: 1.01
UROBILINOGEN UR STRIP.AUTO-MCNC: 0.2 MG/DL
WBC # BLD AUTO: 6.4 K/UL (ref 4.8–10.8)
WBC #/AREA URNS HPF: ABNORMAL /HPF

## 2019-02-27 PROCEDURE — 99285 EMERGENCY DEPT VISIT HI MDM: CPT

## 2019-02-27 PROCEDURE — 81001 URINALYSIS AUTO W/SCOPE: CPT

## 2019-02-27 PROCEDURE — 85025 COMPLETE CBC W/AUTO DIFF WBC: CPT

## 2019-02-27 PROCEDURE — 83690 ASSAY OF LIPASE: CPT

## 2019-02-27 PROCEDURE — 74177 CT ABD & PELVIS W/CONTRAST: CPT

## 2019-02-27 PROCEDURE — 700117 HCHG RX CONTRAST REV CODE 255: Performed by: EMERGENCY MEDICINE

## 2019-02-27 PROCEDURE — 96375 TX/PRO/DX INJ NEW DRUG ADDON: CPT

## 2019-02-27 PROCEDURE — 700111 HCHG RX REV CODE 636 W/ 250 OVERRIDE (IP): Performed by: EMERGENCY MEDICINE

## 2019-02-27 PROCEDURE — 80053 COMPREHEN METABOLIC PANEL: CPT

## 2019-02-27 PROCEDURE — 36415 COLL VENOUS BLD VENIPUNCTURE: CPT

## 2019-02-27 PROCEDURE — 96374 THER/PROPH/DIAG INJ IV PUSH: CPT

## 2019-02-27 RX ORDER — KETOROLAC TROMETHAMINE 30 MG/ML
15 INJECTION, SOLUTION INTRAMUSCULAR; INTRAVENOUS ONCE
Status: COMPLETED | OUTPATIENT
Start: 2019-02-27 | End: 2019-02-27

## 2019-02-27 RX ORDER — TAMSULOSIN HYDROCHLORIDE 0.4 MG/1
0.4 CAPSULE ORAL DAILY
Qty: 7 CAP | Refills: 0 | Status: SHIPPED | OUTPATIENT
Start: 2019-02-27 | End: 2019-03-06

## 2019-02-27 RX ORDER — DIAZEPAM 5 MG/1
5 TABLET ORAL EVERY 8 HOURS PRN
Qty: 9 TAB | Refills: 0 | Status: SHIPPED | OUTPATIENT
Start: 2019-02-27 | End: 2019-03-02

## 2019-02-27 RX ORDER — DICYCLOMINE HCL 20 MG
20 TABLET ORAL ONCE
Status: DISCONTINUED | OUTPATIENT
Start: 2019-02-27 | End: 2019-02-27

## 2019-02-27 RX ORDER — ONDANSETRON 2 MG/ML
4 INJECTION INTRAMUSCULAR; INTRAVENOUS ONCE
Status: COMPLETED | OUTPATIENT
Start: 2019-02-27 | End: 2019-02-27

## 2019-02-27 RX ORDER — IBUPROFEN 800 MG/1
800 TABLET ORAL EVERY 8 HOURS PRN
Qty: 15 TAB | Refills: 0 | Status: SHIPPED | OUTPATIENT
Start: 2019-02-27 | End: 2019-03-04

## 2019-02-27 RX ADMIN — IOHEXOL 100 ML: 350 INJECTION, SOLUTION INTRAVENOUS at 15:53

## 2019-02-27 RX ADMIN — KETOROLAC TROMETHAMINE 15 MG: 30 INJECTION, SOLUTION INTRAMUSCULAR at 14:43

## 2019-02-27 RX ADMIN — ONDANSETRON 4 MG: 2 INJECTION INTRAMUSCULAR; INTRAVENOUS at 14:43

## 2019-02-27 ASSESSMENT — LIFESTYLE VARIABLES: DO YOU DRINK ALCOHOL: NO

## 2019-02-27 NOTE — ED PROVIDER NOTES
"ED Provider Note    Scribed for Jefferson Fuentes M.D. by Reginaldo King. 2/27/2019,  2:31 PM.    CHIEF COMPLAINT  Chief Complaint   Patient presents with   • LUQ Pain     dull early, started getting worse 30-45 min ago   • Nausea       HPI  Isaias Coyle is a 53 y.o. male who presents to the Emergency Department for left upper quadrant pain onset this morning. His pain has been gradually worsening throughout the day and after eating lunch, his pain became sharp and jumped to a 9/10 in severity. Upon arrival to the ED, his pain turned dull and relieved to a 5/10. He reports some associated nausea and sweating with his pain. He describes some left flank pain as well but is unsure if his urine has recently changed in color. He has experienced gas pain in the past but says his current symptoms are not similar. Denies any history of kidney stones. He is not experiencing fever, chills, vomiting.     REVIEW OF SYSTEMS  See HPI for further details. All other systems are negative.     PAST MEDICAL HISTORY   No history of kidney stones    SOCIAL HISTORY  Accompanied by wife who he lives with    SURGICAL HISTORY  patient denies any surgical history    CURRENT MEDICATIONS  Home Medications     Reviewed by Erica Bennett R.N. (Registered Nurse) on 02/27/19 at 1357  Med List Status: <None>   Medication Last Dose Status        Patient Frederick Taking any Medications                       ALLERGIES  Allergies   Allergen Reactions   • Morphine        PHYSICAL EXAM  VITAL SIGNS: BP (!) 165/96   Pulse 65   Temp 36 °C (96.8 °F) (Temporal)   Resp (!) 22   Ht 1.803 m (5' 11\")   Wt 72.6 kg (160 lb)   SpO2 98%   BMI 22.32 kg/m²   Pulse ox interpretation: I interpret this pulse ox as normal.  Constitutional: Alert in no apparent distress.  HENT: No signs of trauma, Bilateral external ears normal, Nose normal.   Eyes: Conjunctiva normal, Non-icteric.   Neck: Normal range of motion, Supple, No stridor.   Lymphatic: No " lymphadenopathy noted.   Cardiovascular: Regular rate and rhythm, no murmurs.   Thorax & Lungs: Normal breath sounds, No respiratory distress, No wheezing, No chest tenderness.   Abdomen: Bowel sounds normal, Soft, left upper quadrant tenderness, No masses, No pulsatile masses. No peritoneal signs.  Skin: Warm, Dry, No erythema, No rash. Pale.  Back: No midline bony tenderness. Moderate left CVA tenderness.   Extremities: Intact distal pulses, No edema, No cyanosis.  Musculoskeletal: Good range of motion in all major joints. No or major deformities noted.   Neurologic: Alert , Normal motor function, Normal sensory function, No focal deficits noted.   Psychiatric: Affect normal, Judgment normal, Mood normal.     DIAGNOSTIC STUDIES / PROCEDURES    LABS  Labs Reviewed   COMP METABOLIC PANEL - Abnormal; Notable for the following:        Result Value    Potassium 3.4 (*)     Anion Gap 14.0 (*)     Glucose 121 (*)     AST(SGOT) 48 (*)     ALT(SGPT) 92 (*)     All other components within normal limits   URINALYSIS,CULTURE IF INDICATED - Abnormal; Notable for the following:     Character Cloudy (*)     Leukocyte Esterase Trace (*)     Occult Blood Large (*)     All other components within normal limits   URINE MICROSCOPIC (W/UA) - Abnormal; Notable for the following:     WBC 2-5 (*)     RBC >150 (*)     All other components within normal limits   CBC WITH DIFFERENTIAL   LIPASE   ESTIMATED GFR     All labs reviewed by me.    RADIOLOGY  CT-ABDOMEN-PELVIS WITH   Final Result      1.  No evidence of bowel obstruction or focal inflammatory change.      2.  No definite left ureteral stone seen.      3.  Prominent degenerative disc disease at L2-3 with kyphotic deformity.      4.  Grade 1 spondylolysis at L5-S1.        The radiologist's interpretation of all radiological studies have been reviewed by me.    COURSE & MEDICAL DECISION MAKING  Nursing notes, VS, PMSFHx reviewed in chart.     2:31 PM Patient seen and examined at  bedside. Differential diagnosis includes but is not limited to kidney stone, uretal stone, urinary tract infection, constipation, bowel obstruction, less likely splenic injury. Informed patient and wife that his symptoms may be due to a kidney stone, which is my highest suspicion, based on his sudden onset of severe sharp pain, nausea, difficulty finding a position of comfort, and lack of any other recent illness. Blood and urine tests will evaluate. Ordered for CT abdomen, CBC, CMP, lipase, and urinalysis to evaluate. Patient will be treated with Zofran 4 mg IV, Toradol 15 mg IV, and bentyl 20 mg PO for his symptoms.     3:19 PM Patient is feeling improved with the given medication. Informed him of work up results which showed blood in his urine, which again would be consistent with a kidney stone, though there are other possible diagnoses such as urinary tract infection, pyelonephritis, or even abdominal aortic aneurysm, though this seems fairly unlikely.    4:31 PM Discussed work up results with patient.  His CT scan was unremarkable.  It was performed with contrast, with no obvious abnormalities, including of the large abdominal vessels.  I let the patient know that I suspect he has passed a kidney stone, but I am not certain of that diagnosis, and that he should return for worsening symptoms rather than gradual improvement.  Patient is stable for discharge at this time. Discussed return precautions and follow up if symptoms do not improve. He agrees to the plan of care.     The patient will return for new or worsening symptoms and is stable at the time of discharge.    The patient is referred to a primary physician for blood pressure management, diabetic screening, and for all other preventative health concerns.    DISPOSITION:  Patient will be discharged home in stable condition.    FOLLOW UP:  Torito Caballero M.D.  [address] [facility]  [place] [address]  [phone]      for urology consultation      OUTPATIENT  MEDICATIONS:  Discharge Medication List as of 2/27/2019  4:25 PM      START taking these medications    Details   tamsulosin (FLOMAX) 0.4 MG capsule Take 1 Cap by mouth every day for 7 days., Disp-7 Cap, R-0, Print Rx Paper      diazePAM (VALIUM) 5 MG Tab Take 1 Tab by mouth every 8 hours as needed for Anxiety for up to 3 days., Disp-9 Tab, R-0, Print Rx Paper, For 3 days      ibuprofen (MOTRIN) 800 MG Tab Take 1 Tab by mouth every 8 hours as needed for Moderate Pain for up to 5 days., Disp-15 Tab, R-0, Print Rx Paper              FINAL IMPRESSION  1. Left upper quadrant pain Acute   2. Hematuria, unspecified type Active        IReginaldo (Scribe), am scribing for, and in the presence of, Jefferson Fuentes M.D..    Electronically signed by: Reginaldo King (Scribe), 2/27/2019    IJefferosn M.D. personally performed the services described in this documentation, as scribed by Reginaldo King in my presence, and it is both accurate and complete. C.    The note accurately reflects work and decisions made by me.  Jefferson Fuentes  2/27/2019  8:41 PM

## 2019-02-28 NOTE — DISCHARGE INSTRUCTIONS
Your scan did not show a kidney stone. Your symptoms and urinary blood and your improvement suggest that you've passed the stone already. There were no other abnormalities on your CT or labs. Please use the medication we've prescribed, follow up with primary care and urology, and return to the ER for worsening symptoms.

## 2019-02-28 NOTE — ED NOTES
Patient given specimen cup and urine strainer. Given d/c instructions and prescriptions. Verbalized understanding. VSS. D/c'd home.

## 2020-10-27 RX ORDER — CLOBETASOL PROPIONATE 0.5 MG/G
CREAM TOPICAL
Qty: 1 | Refills: 4 | Status: ERX

## 2022-02-02 ENCOUNTER — RX ONLY (OUTPATIENT)
Age: 57
Setting detail: RX ONLY
End: 2022-02-02

## 2022-02-02 RX ORDER — CLOBETASOL PROPIONATE 0.5 MG/G
CREAM TOPICAL
Qty: 60 | Refills: 6 | Status: ERX

## 2022-02-10 ENCOUNTER — RX ONLY (OUTPATIENT)
Age: 57
Setting detail: RX ONLY
End: 2022-02-10

## 2022-02-10 RX ORDER — TRIAMCINOLONE ACETONIDE 1 MG/G
CREAM TOPICAL BID
Qty: 454 | Refills: 6 | Status: ERX

## 2023-05-10 ENCOUNTER — TELEPHONE (OUTPATIENT)
Dept: CARDIOLOGY | Facility: MEDICAL CENTER | Age: 58
End: 2023-05-10
Payer: COMMERCIAL

## 2023-05-10 NOTE — TELEPHONE ENCOUNTER
LVM asking if patient has seen another Cardiologist in the past or had any cardiac testing done outside of [facility] to call with information so that records can be requested prior to appointment. BN 5/25/23

## 2023-05-24 NOTE — PROGRESS NOTES
CARDIOLOGY CONSULTATION NOTE      Date of Visit: 5/24/2023    Primary Care Provider: No primary care provider on file.    Patient Name: Isaias Coyle  YOB: 1965  MRN: [id number]     Reason for Visit:   Family history of coronary artery disease???     Patient Story:   Isaias Coyle is a 57 year-old *** with a past medical history of ***. Briefly,     *** presents today for consultation.      Medications and Allergies:     No current outpatient medications on file.     No current facility-administered medications for this visit.       Allergies   Allergen Reactions    Morphine         Medical Decision Making:   #     #     #     #     ASCVD risk  The ASCVD Risk score (Jaxson OSPINA, et al., 2019) failed to calculate for the following reasons:    Cannot find a previous HDL lab    Cannot find a previous total cholesterol lab    The smoking status is invalid    Follow Up  ***     Cardiac Studies and Procedures:   Echocardiography  ***    Coronary Angiography/Cardiac Catheterization  ***    Percutaneous Coronary Intervention  ***    Stress Testing  ***    CT/MRI  ***    Ultrasound  ***    Cardiac Surgery  ***    Electrophysiology  ***     Vital Signs:   There were no vitals taken for this visit.   BP Readings from Last 4 Encounters:   02/27/19 115/74     Wt Readings from Last 4 Encounters:   02/27/19 72.6 kg (160 lb)     There is no height or weight on file to calculate BMI.     Laboratories:   Lipids  No results found for: LDL    No results found for: HDL    No results found for: TRIGLYCERIDE    No results found for: CHOLSTRLTOT    No components found for: LPA      Chemistries  Lab Results   Component Value Date/Time    CREATININE 1.23 02/27/2019 02:08 PM     Lab Results   Component Value Date/Time    BUN 15 02/27/2019 02:08 PM     Lab Results   Component Value Date/Time    POTASSIUM 3.4 (L) 02/27/2019 02:08 PM     Lab Results   Component Value Date/Time    SODIUM 141 02/27/2019 02:08 PM      Lab Results   Component Value Date/Time    GLUCOSE 121 (H) 02/27/2019 02:08 PM     Lab Results   Component Value Date/Time    ASTSGOT 48 (H) 02/27/2019 02:08 PM     Lab Results   Component Value Date/Time    ALTSGPT 92 (H) 02/27/2019 02:08 PM     Lab Results   Component Value Date/Time    ALKPHOSPHAT 65 02/27/2019 02:08 PM     No results found for: HBA1C  No results found for: TSH  No results found for: NTPROBNP  No results found for: TROPONINT    Blood Counts  Lab Results   Component Value Date/Time    HEMOGLOBIN 15.6 02/27/2019 02:08 PM     Lab Results   Component Value Date/Time    PLATELETCT 273 02/27/2019 02:08 PM     Lab Results   Component Value Date/Time    WBC 6.4 02/27/2019 02:08 PM        Physical Examination:   General: Well-appearing, no acute distress  Eyes: Extraocular movements intact, anicteric  Ears: *** ear lobe crease  Neck: Full range of motion, no jugular venous distension  Pulmonary: Normal respiratory effort, clear to auscultation bilaterally  Cardiovascular: Regular rate and rhythm, no murmurs or gallops appreciated  Gastrointestinal: Soft, non-tender, non-distended  Extremities: Warm and well perfused, no lower extremity edema  Neurological: Alert and oriented, no gross focal motor deficits  Psychiatric: Normal affect, normal judgment     Past History:   Past Medical History  The patient's past medical history was reviewed.  See HPI and self-reported patient medical history form for pertinent medical history to consultation.    Past Social History  The patient's social history was reviewed.  See [facility] self-reported patient medical history form for pertinent social history to consultation.    Past Family History  The patient's family history was reviewed.  See [facility] self-reported patient medical history form for pertinent family history to consultation.    Review of Systems  A pertinent cardiac review of systems was performed and was otherwise unremarkable except as per HPI and self-reported  patient medical history form.        Adolfo Gonzalez MD, [facility]  Interventional Cardiology  [facility], [address]  [address] [address]  Phone: [phone]  Fax: [phone]

## 2023-05-25 ENCOUNTER — APPOINTMENT (OUTPATIENT)
Dept: CARDIOLOGY | Facility: MEDICAL CENTER | Age: 58
End: 2023-05-25
Payer: COMMERCIAL

## 2023-05-30 NOTE — TELEPHONE ENCOUNTER
Called patient in regards to records for NP appointment with Dr. Gonzalez. To review most recent lab results, ekg, any cardiac testing or ,if patient has been treated by a cardiologist. No answer, left voicemail to call back

## 2023-06-05 NOTE — TELEPHONE ENCOUNTER
Called patient in regards to records for NP appointment with Dr. AGUSTIN. To review most recent lab results, ekg, any cardiac testing or ,if they had been treated by a cardiologist. No answer, left voicemail to call back

## 2023-06-08 ENCOUNTER — OFFICE VISIT (OUTPATIENT)
Dept: CARDIOLOGY | Facility: MEDICAL CENTER | Age: 58
End: 2023-06-08
Attending: INTERNAL MEDICINE
Payer: COMMERCIAL

## 2023-06-08 VITALS
BODY MASS INDEX: 20.02 KG/M2 | RESPIRATION RATE: 14 BRPM | DIASTOLIC BLOOD PRESSURE: 84 MMHG | OXYGEN SATURATION: 97 % | HEIGHT: 71 IN | HEART RATE: 58 BPM | WEIGHT: 143 LBS | SYSTOLIC BLOOD PRESSURE: 124 MMHG

## 2023-06-08 DIAGNOSIS — R07.89 CHEST TIGHTNESS: ICD-10-CM

## 2023-06-08 DIAGNOSIS — Z82.49 FAMILY HISTORY OF PREMATURE CAD: ICD-10-CM

## 2023-06-08 DIAGNOSIS — R07.9 CHEST PAIN IN ADULT: ICD-10-CM

## 2023-06-08 LAB — EKG IMPRESSION: NORMAL

## 2023-06-08 PROCEDURE — 3074F SYST BP LT 130 MM HG: CPT | Performed by: INTERNAL MEDICINE

## 2023-06-08 PROCEDURE — 93005 ELECTROCARDIOGRAM TRACING: CPT | Performed by: INTERNAL MEDICINE

## 2023-06-08 PROCEDURE — 99204 OFFICE O/P NEW MOD 45 MIN: CPT | Mod: 25 | Performed by: INTERNAL MEDICINE

## 2023-06-08 PROCEDURE — 93010 ELECTROCARDIOGRAM REPORT: CPT | Performed by: INTERNAL MEDICINE

## 2023-06-08 PROCEDURE — 3079F DIAST BP 80-89 MM HG: CPT | Performed by: INTERNAL MEDICINE

## 2023-06-08 PROCEDURE — 99211 OFF/OP EST MAY X REQ PHY/QHP: CPT | Performed by: INTERNAL MEDICINE

## 2023-06-08 RX ORDER — ASPIRIN 81 MG/1
81 TABLET ORAL DAILY
Qty: 100 TABLET | Refills: 3 | Status: SHIPPED | OUTPATIENT
Start: 2023-06-08

## 2023-06-08 NOTE — PROGRESS NOTES
Cardiology Initial Consultation Note    Date of note:    6/8/2023    Primary Care Provider: Pcp Pt States None  Referring Provider: No ref. provider found    Patient Name: Isaias Coyle   YOB: 1965  MRN:              [id number]    Chief Complaint   Patient presents with    Chest Pain       History of Present Illness: Mr. Isaias Coyle is a 57-year-old man with no significant past cardiac history who presents to the cardiovascular office for further evaluation of chest pain.    Patient states that he has family history significant for coronary artery disease and myocardial infarction.  Father had MI at the age of 53.  He also has a paternal grandfather with history of myocardial from infarction in his early 40s.    Symptoms initially started about 2 months ago.  He first noticed it while [place] and feeding [place] horse.  Described as a chest tightness in the center of his chest.  Also noticed that it was difficult for him to catch his breath.  Pain was nonradiating and had no associated jaw pain or arm pain.  Did not have associated palpitations, diaphoresis or episodes of syncope.  Since initial onset, he has had few episodes of recurrence.  It has generally occurred with laborious activities such as feeding his horse or working around his home/ranch.     He is a very activity individual who exercises and does a lot of strenuous activities regularly. Typically goes hiking, mountain biking/road cycling. Also back country skiing during the winter. He has generally done well with these strenuous activities. However, he did have an episode of chest pain while he was mountain biking 1 month ago. Lasted for 30secs to 1 min while biking. Pain resolved and he continued biking for another hour without recurrence of pain.    Since then, he has not had recurrent symptoms in the past 3 weeks. Feels fine today without chest pain, dyspnea, palpitations, lower extremity  edema.    Cardiovascular Risk Factors:  1. Smoking status: Denies  2. Type II Diabetes Mellitus: Denies No results found for: HBA1C  3. Hypertension: Denies  4. Dyslipidemia: Denies No results found for: CHOLSTRLTOT, LDL, HDL, TRIGLYCERIDE  5. Family history of early Coronary Artery Disease in a first degree relative (Male less than 55 years of age; Female less than 65 years of age): History of premature CAD in father and paternal grandfather. Father had MI at 53.       Review of Systems:  As per HPI. All other systems reviewed and are negative.      History reviewed. No pertinent past medical history.      History reviewed. No pertinent surgical history.      Current Outpatient Medications   Medication Sig Dispense Refill    aspirin 81 MG EC tablet Take 1 Tablet by mouth every day. 100 Tablet 3     No current facility-administered medications for this visit.         Allergies   Allergen Reactions    Morphine          Family History   Problem Relation Age of Onset    Hypertension Mother     Heart Attack Father     Heart Attack Paternal Grandfather          Social History     Socioeconomic History    Marital status:      Spouse name: Not on file    Number of children: Not on file    Years of education: Not on file    Highest education level: Not on file   Occupational History    Not on file   Tobacco Use    Smoking status: Never    Smokeless tobacco: Never   Substance and Sexual Activity    Alcohol use: Never    Drug use: Never    Sexual activity: Not on file   Other Topics Concern    Not on file   Social History Narrative    Not on file     Social Determinants of Health     Financial Resource Strain: Not on file   Food Insecurity: Not on file   Transportation Needs: Not on file   Physical Activity: Not on file   Stress: Not on file   Social Connections: Not on file   Intimate Partner Violence: Not on file   Housing Stability: Not on file         Physical Exam:  Ambulatory Vitals  /84 (BP Location: Left  "arm, Patient Position: Sitting, BP Cuff Size: Adult)   Pulse (!) 58   Resp 14   Ht 1.803 m (5' 11\")   Wt 64.9 kg (143 lb)   SpO2 97%    Oxygen Therapy:  Pulse Oximetry: 97 %  BP Readings from Last 4 Encounters:   06/08/23 124/84   02/27/19 115/74       Weight/BMI: Body mass index is 19.94 kg/m².  Wt Readings from Last 4 Encounters:   06/08/23 64.9 kg (143 lb)   02/27/19 72.6 kg (160 lb)       General: Not in acute distress, appears comfortable  HEENT: OP clear   Neck:  No carotid bruits, No JVD appreciated  CVS:  RRR, Normal S1, S2. No murmurs, rubs or gallops  Resp: Normal respiratory effort, lungs CTA bilaterally. No rales or rhonchi  Abdomen: Soft, non-distended, non-tender to palpation, no guarding or rigidity  Skin: No obvious rashes, no cyanosis  Neurological: Alert and oriented x3, moves all extremities, no focal neurologic deficits  Extremities:   Extremities warm, no edema in lower ext, pulses intact x 4      Lab Data Review:  No results found for: CHOLSTRLTOT, LDL, HDL, TRIGLYCERIDE    Lab Results   Component Value Date/Time    SODIUM 141 02/27/2019 02:08 PM    POTASSIUM 3.4 (L) 02/27/2019 02:08 PM    CHLORIDE 104 02/27/2019 02:08 PM    CO2 23 02/27/2019 02:08 PM    GLUCOSE 121 (H) 02/27/2019 02:08 PM    BUN 15 02/27/2019 02:08 PM    CREATININE 1.23 02/27/2019 02:08 PM     Lab Results   Component Value Date/Time    ALKPHOSPHAT 65 02/27/2019 02:08 PM    ASTSGOT 48 (H) 02/27/2019 02:08 PM    ALTSGPT 92 (H) 02/27/2019 02:08 PM    TBILIRUBIN 1.0 02/27/2019 02:08 PM      Lab Results   Component Value Date/Time    WBC 6.4 02/27/2019 02:08 PM     No results found for: HBA1C      Cardiac Imaging and Procedures Review:    EKG dated 6/8/23:   My personal interpretation is sinus bradycardia rate 53 bpm, possible LVH, nonspecific ST-T changes      Assessment & Plan     1. Chest tightness  EKG    EC-ECHOCARDIOGRAM COMPLETE W/O CONT    Lipid Profile    HEMOGLOBIN A1C (Glycohemoglobin GHB Total/A1C with MBG " Estimate)    Comp Metabolic Panel    CBC WITHOUT DIFFERENTIAL    CT-CTA HEART W/3D IMAGE    aspirin 81 MG EC tablet      2. Chest pain in adult        3. Family history of premature CAD  EC-ECHOCARDIOGRAM COMPLETE W/O CONT    Lipid Profile    HEMOGLOBIN A1C (Glycohemoglobin GHB Total/A1C with MBG Estimate)    Comp Metabolic Panel    CBC WITHOUT DIFFERENTIAL    CT-CTA HEART W/3D IMAGE    aspirin 81 MG EC tablet            Medical Decision Making:  Mr. Isaias Coyle is a 57-year-old man with no significant past cardiac history except for family history of premature CAD who presents to the cardiovascular office for further evaluation of chest pain.    1. Chest tightness  - EKG  - EC-ECHOCARDIOGRAM COMPLETE W/O CONT; Future  - Lipid Profile; Future  - HEMOGLOBIN A1C (Glycohemoglobin GHB Total/A1C with MBG Estimate); Future  - Comp Metabolic Panel; Future  - CBC WITHOUT DIFFERENTIAL; Future  - CT-CTA HEART W/3D IMAGE; Future  - aspirin 81 MG EC tablet; Take 1 Tablet by mouth every day.  Dispense: 100 Tablet; Refill: 3    2. Chest pain in adult    3. Family history of premature CAD  - EC-ECHOCARDIOGRAM COMPLETE W/O CONT; Future  - Lipid Profile; Future  - HEMOGLOBIN A1C (Glycohemoglobin GHB Total/A1C with MBG Estimate); Future  - Comp Metabolic Panel; Future  - CBC WITHOUT DIFFERENTIAL; Future  - CT-CTA HEART W/3D IMAGE; Future  - aspirin 81 MG EC tablet; Take 1 Tablet by mouth every day.  Dispense: 100 Tablet; Refill: 3      Mr. Coyle presents to the office for further evaluation of exertional chest pain. His symptoms have been ongoing for the past 2 months but has not recurred in approximately 3 weeks.  He does have significant family history notable for premature CAD in his father and paternal grandfather.  His symptoms are concerning for obstructive CAD and will require additional work-up for further evaluation.  In office EKG reviewed.  Shows sinus bradycardia with a rate of 53 bpm, possible LVH, no ST-T  suggestive of ischemia. We will check lipid panel for restratification.  He will be started on aspirin 81 mg daily.  Resting bradycardia in the 50s will prevent initiation of beta-blocker therapy.  We will arrange for an echocardiogram to evaluate LV function and rule out any major structural abnormalities.  In addition, will pursue ischemic work-up with coronary CTA to define coronary anatomy and rule out obstructive CAD.  ER precautions given. He is aware to call our office or seek medical attention if he has recurrent chest pain that does not resolve with rest.     It was a pleasure seeing Mr. Isaias Coyle in the office today. Return in about 4 weeks (around 7/6/2023). Patient is aware to call the cardiology clinic with any questions or concerns.      Rauedl Bonner MD, [facility]  Cardiologist, [facility] Medicine, [facility]  [address] [address]  Phone: [phone]  Fax: [phone]    Please note that this dictation was created using voice recognition software. I have made every reasonable attempt to correct obvious errors, but it is possible there are errors of grammar and possibly content that I did not discover before finalizing the note.

## 2023-06-09 ENCOUNTER — HOSPITAL ENCOUNTER (OUTPATIENT)
Dept: CARDIOLOGY | Facility: MEDICAL CENTER | Age: 58
End: 2023-06-09
Attending: INTERNAL MEDICINE
Payer: COMMERCIAL

## 2023-06-09 DIAGNOSIS — R07.89 CHEST TIGHTNESS: ICD-10-CM

## 2023-06-09 DIAGNOSIS — Z82.49 FAMILY HISTORY OF PREMATURE CAD: ICD-10-CM

## 2023-06-09 LAB — LV EJECT FRACT  99904: 55

## 2023-06-09 PROCEDURE — 93306 TTE W/DOPPLER COMPLETE: CPT

## 2023-06-09 PROCEDURE — 93306 TTE W/DOPPLER COMPLETE: CPT | Mod: 26 | Performed by: INTERNAL MEDICINE

## 2023-06-14 ENCOUNTER — TELEPHONE (OUTPATIENT)
Dept: CARDIOLOGY | Facility: MEDICAL CENTER | Age: 58
End: 2023-06-14
Payer: COMMERCIAL

## 2023-06-14 NOTE — TELEPHONE ENCOUNTER
Order paperwork filled out and placed on MK desk to sign  Will scan & upload to chart via [employer] once faxed.

## 2023-06-14 NOTE — TELEPHONE ENCOUNTER
----- Message from Jaleesa Benitez R.N. sent at 6/13/2023 12:49 PM PDT -----  Regarding: FW: LDCR    ----- Message -----  From: Raudel FREDERICK M.D.  Sent: 6/13/2023  12:43 PM PDT  To: Jaleesa Benitez R.N.  Subject: FW: LDCR                                         Please see below. I've ordered a coronary CTA for Mr. Coyle. Radiology is requesting a LRD coronary CTA form to be filled out prior to the study getting done.     Thank you,  MK  ----- Message -----  From: Meredith Chua  Sent: 6/9/2023   1:13 PM PDT  To: Raudel FREDERICK M.D.  Subject: LDCR                                             Hi,    Can you please upload the low radiation dose coronary order sheet into media or fax to us at [phone]?    Thank you! Have a good day!

## 2023-06-16 NOTE — PROGRESS NOTES
Request received to review order/ protocol for coronary CTA. Scan approved. Upon review of available medical records, no additional orders have been placed.    This CT study may be scheduled through [facility] Scheduling at , option 2.

## 2023-06-16 NOTE — TELEPHONE ENCOUNTER
Retrieved signed form from 's desk. Faxed to imaging services [phone], received receipt for confirmation and scanned through [employer].

## 2023-06-28 ENCOUNTER — HOSPITAL ENCOUNTER (OUTPATIENT)
Dept: LAB | Facility: MEDICAL CENTER | Age: 58
End: 2023-06-28
Attending: INTERNAL MEDICINE
Payer: COMMERCIAL

## 2023-06-28 DIAGNOSIS — R07.89 CHEST TIGHTNESS: ICD-10-CM

## 2023-06-28 DIAGNOSIS — Z82.49 FAMILY HISTORY OF PREMATURE CAD: ICD-10-CM

## 2023-06-28 LAB
ALBUMIN SERPL BCP-MCNC: 4.4 G/DL (ref 3.2–4.9)
ALBUMIN/GLOB SERPL: 1.8 G/DL
ALP SERPL-CCNC: 57 U/L (ref 30–99)
ALT SERPL-CCNC: 28 U/L (ref 2–50)
ANION GAP SERPL CALC-SCNC: 11 MMOL/L (ref 7–16)
AST SERPL-CCNC: 26 U/L (ref 12–45)
BILIRUB SERPL-MCNC: 1 MG/DL (ref 0.1–1.5)
BUN SERPL-MCNC: 23 MG/DL (ref 8–22)
CALCIUM ALBUM COR SERPL-MCNC: 9.2 MG/DL (ref 8.5–10.5)
CALCIUM SERPL-MCNC: 9.5 MG/DL (ref 8.5–10.5)
CHLORIDE SERPL-SCNC: 106 MMOL/L (ref 96–112)
CHOLEST SERPL-MCNC: 200 MG/DL (ref 100–199)
CO2 SERPL-SCNC: 25 MMOL/L (ref 20–33)
CREAT SERPL-MCNC: 1.17 MG/DL (ref 0.5–1.4)
ERYTHROCYTE [DISTWIDTH] IN BLOOD BY AUTOMATED COUNT: 44.8 FL (ref 35.9–50)
EST. AVERAGE GLUCOSE BLD GHB EST-MCNC: 97 MG/DL
FASTING STATUS PATIENT QL REPORTED: NORMAL
GFR SERPLBLD CREATININE-BSD FMLA CKD-EPI: 73 ML/MIN/1.73 M 2
GLOBULIN SER CALC-MCNC: 2.4 G/DL (ref 1.9–3.5)
GLUCOSE SERPL-MCNC: 103 MG/DL (ref 65–99)
HBA1C MFR BLD: 5 % (ref 4–5.6)
HCT VFR BLD AUTO: 44.1 % (ref 42–52)
HDLC SERPL-MCNC: 60 MG/DL
HGB BLD-MCNC: 14.3 G/DL (ref 14–18)
LDLC SERPL CALC-MCNC: 124 MG/DL
MCH RBC QN AUTO: 30.4 PG (ref 27–33)
MCHC RBC AUTO-ENTMCNC: 32.4 G/DL (ref 32.3–36.5)
MCV RBC AUTO: 93.6 FL (ref 81.4–97.8)
PLATELET # BLD AUTO: 236 K/UL (ref 164–446)
PMV BLD AUTO: 10.6 FL (ref 9–12.9)
POTASSIUM SERPL-SCNC: 4.4 MMOL/L (ref 3.6–5.5)
PROT SERPL-MCNC: 6.8 G/DL (ref 6–8.2)
RBC # BLD AUTO: 4.71 M/UL (ref 4.7–6.1)
SODIUM SERPL-SCNC: 142 MMOL/L (ref 135–145)
TRIGL SERPL-MCNC: 81 MG/DL (ref 0–149)
WBC # BLD AUTO: 5.4 K/UL (ref 4.8–10.8)

## 2023-06-28 PROCEDURE — 83036 HEMOGLOBIN GLYCOSYLATED A1C: CPT

## 2023-06-28 PROCEDURE — 80053 COMPREHEN METABOLIC PANEL: CPT

## 2023-06-28 PROCEDURE — 36415 COLL VENOUS BLD VENIPUNCTURE: CPT

## 2023-06-28 PROCEDURE — 85027 COMPLETE CBC AUTOMATED: CPT

## 2023-06-28 PROCEDURE — 80061 LIPID PANEL: CPT

## 2023-07-03 ENCOUNTER — TELEPHONE (OUTPATIENT)
Dept: CARDIOLOGY | Facility: MEDICAL CENTER | Age: 58
End: 2023-07-03
Payer: COMMERCIAL

## 2023-07-03 ENCOUNTER — HOSPITAL ENCOUNTER (OUTPATIENT)
Dept: RADIOLOGY | Facility: MEDICAL CENTER | Age: 58
End: 2023-07-03
Attending: INTERNAL MEDICINE
Payer: COMMERCIAL

## 2023-07-03 VITALS
DIASTOLIC BLOOD PRESSURE: 81 MMHG | RESPIRATION RATE: 18 BRPM | HEART RATE: 47 BPM | SYSTOLIC BLOOD PRESSURE: 121 MMHG | OXYGEN SATURATION: 97 %

## 2023-07-03 DIAGNOSIS — Z82.49 FAMILY HISTORY OF PREMATURE CAD: ICD-10-CM

## 2023-07-03 DIAGNOSIS — R07.89 CHEST TIGHTNESS: ICD-10-CM

## 2023-07-03 PROCEDURE — 700102 HCHG RX REV CODE 250 W/ 637 OVERRIDE(OP): Performed by: RADIOLOGY

## 2023-07-03 PROCEDURE — A9270 NON-COVERED ITEM OR SERVICE: HCPCS | Performed by: RADIOLOGY

## 2023-07-03 PROCEDURE — 700117 HCHG RX CONTRAST REV CODE 255: Performed by: INTERNAL MEDICINE

## 2023-07-03 PROCEDURE — 75574 CT ANGIO HRT W/3D IMAGE: CPT

## 2023-07-03 RX ORDER — NITROGLYCERIN 0.4 MG/1
0.4 TABLET SUBLINGUAL ONCE
Status: COMPLETED | OUTPATIENT
Start: 2023-07-03 | End: 2023-07-03

## 2023-07-03 RX ADMIN — NITROGLYCERIN 0.4 MG: 0.4 TABLET, ORALLY DISINTEGRATING SUBLINGUAL at 15:34

## 2023-07-03 RX ADMIN — IOHEXOL 100 ML: 350 INJECTION, SOLUTION INTRAVENOUS at 16:11

## 2023-07-04 NOTE — TELEPHONE ENCOUNTER
Received a message from Dr. Sarmiento regarding CTA on patient with occluded proximal RCA with some reconstitution distal vessel.  Based on clinic visit 6/8/2023, patient has not had any further chest pain in past three weeks.  He has aspirin on board.  Has fu appointment with Dr. Bonner 7/10/2023.  If he still does not have chest pain, then likely can wait for the appointment on July 10 on aspirin and will give SL nitro with instructions to go to ED with any recurrence of chest pain.     Labs 6/28/23: , , HDL 60.  Echo dated 6/9/2023: normal LVEF, no significant valvular stenosis or regurgitation.     I tried calling patient at [phone] and got voicemail.  Did not leave message.  Will try again a little later.

## 2023-07-04 NOTE — TELEPHONE ENCOUNTER
Called patient again.  Got voicemail.  Patient based on last note seemed clinically stable.  Will have staff touch base with him on July 5 to see how he is doing.

## 2023-07-05 ENCOUNTER — TELEPHONE (OUTPATIENT)
Dept: CARDIOLOGY | Facility: MEDICAL CENTER | Age: 58
End: 2023-07-05
Payer: COMMERCIAL

## 2023-07-05 DIAGNOSIS — E78.5 DYSLIPIDEMIA: ICD-10-CM

## 2023-07-05 DIAGNOSIS — I20.9 ANGINA PECTORIS (HCC): ICD-10-CM

## 2023-07-05 DIAGNOSIS — I25.119 CORONARY ARTERY DISEASE INVOLVING NATIVE CORONARY ARTERY OF NATIVE HEART WITH ANGINA PECTORIS (HCC): ICD-10-CM

## 2023-07-05 RX ORDER — ISOSORBIDE MONONITRATE 30 MG/1
30 TABLET, EXTENDED RELEASE ORAL EVERY MORNING
Qty: 90 TABLET | Refills: 0 | Status: SHIPPED | OUTPATIENT
Start: 2023-07-05 | End: 2023-10-27 | Stop reason: SDUPTHER

## 2023-07-05 RX ORDER — ATORVASTATIN CALCIUM 40 MG/1
40 TABLET, FILM COATED ORAL NIGHTLY
Qty: 90 TABLET | Refills: 3 | Status: SHIPPED | OUTPATIENT
Start: 2023-07-05 | End: 2023-11-13 | Stop reason: SDUPTHER

## 2023-07-05 NOTE — TELEPHONE ENCOUNTER
Coronary CTA results reviewed. Discussed results and next steps with patient. Plan for coronary angiogram given obstructive lesions and exertional chest pain. Risk and benefits of cardiac cath discuss with patient and he agrees to proceed. Start atorvastatin 40mg daily, Imdur 30mg daily. Continue aspirin 81mg. No beta blocker due to resting bradycardia.    Electronically signed: Raudel Bonner MD  Cardiologist, [facility]    Please note that this dictation was created using voice recognition software. There are errors of grammar and possibly content I did not discover before finalizing the note.     7/5/2023  8:46 AM

## 2023-07-05 NOTE — TELEPHONE ENCOUNTER
MK spoke with pt this morning (please see telephone encounter 07/05/23). Pt was also scheduled for cardiac cath on 07/20/23 per Christy's notes.

## 2023-07-05 NOTE — TELEPHONE ENCOUNTER
LM on patient's voicemail requesting a call back to schedule the [facility] w/poss orders by Dr. Bonner.

## 2023-07-06 ENCOUNTER — PATIENT MESSAGE (OUTPATIENT)
Dept: CARDIOLOGY | Facility: MEDICAL CENTER | Age: 58
End: 2023-07-06
Payer: COMMERCIAL

## 2023-07-07 ENCOUNTER — TELEPHONE (OUTPATIENT)
Dept: CARDIOLOGY | Facility: MEDICAL CENTER | Age: 58
End: 2023-07-07
Payer: COMMERCIAL

## 2023-07-07 NOTE — TELEPHONE ENCOUNTER
[facility]      Caller: Isaias    Topic/issue: Pt has spoken to me twice today and is trying to reach someone to see if he still needs to keep his appt w/ [facility] on Monday 07- @ 9:20am. He sent a [employer] message yesterday, but hasn't heard back. He is a local  and is trying to switch around his case load and court time if he needs to be there or if he could cancel. Pt is asking for a call back today before the weekend to make adjustments if possible.    Callback Number: [phone]

## 2023-07-10 ENCOUNTER — APPOINTMENT (OUTPATIENT)
Dept: CARDIOLOGY | Facility: MEDICAL CENTER | Age: 58
End: 2023-07-10
Attending: INTERNAL MEDICINE
Payer: COMMERCIAL

## 2023-07-17 ENCOUNTER — PATIENT MESSAGE (OUTPATIENT)
Dept: CARDIOLOGY | Facility: MEDICAL CENTER | Age: 58
End: 2023-07-17
Payer: COMMERCIAL

## 2023-07-19 ENCOUNTER — PRE-ADMISSION TESTING (OUTPATIENT)
Dept: ADMISSIONS | Facility: MEDICAL CENTER | Age: 58
End: 2023-07-19
Payer: COMMERCIAL

## 2023-07-19 NOTE — OR NURSING
Preadmit: RN attempted to reach patient to do PAT for procedure scheduled tomorrow 7/20/23. VM left for patient.

## 2023-07-20 ENCOUNTER — HOSPITAL ENCOUNTER (OUTPATIENT)
Facility: MEDICAL CENTER | Age: 58
End: 2023-07-20
Attending: INTERNAL MEDICINE | Admitting: INTERNAL MEDICINE
Payer: COMMERCIAL

## 2023-07-20 ENCOUNTER — APPOINTMENT (OUTPATIENT)
Dept: CARDIOLOGY | Facility: MEDICAL CENTER | Age: 58
End: 2023-07-20
Attending: INTERNAL MEDICINE
Payer: COMMERCIAL

## 2023-07-20 VITALS
WEIGHT: 155.42 LBS | DIASTOLIC BLOOD PRESSURE: 65 MMHG | BODY MASS INDEX: 21.76 KG/M2 | RESPIRATION RATE: 18 BRPM | OXYGEN SATURATION: 98 % | TEMPERATURE: 97.5 F | SYSTOLIC BLOOD PRESSURE: 130 MMHG | HEIGHT: 71 IN | HEART RATE: 52 BPM

## 2023-07-20 DIAGNOSIS — I20.9 ANGINA PECTORIS (HCC): ICD-10-CM

## 2023-07-20 DIAGNOSIS — I25.119 CORONARY ARTERY DISEASE INVOLVING NATIVE CORONARY ARTERY OF NATIVE HEART WITH ANGINA PECTORIS (HCC): ICD-10-CM

## 2023-07-20 LAB
ALBUMIN SERPL BCP-MCNC: 4.3 G/DL (ref 3.2–4.9)
ALBUMIN/GLOB SERPL: 1.7 G/DL
ALP SERPL-CCNC: 56 U/L (ref 30–99)
ALT SERPL-CCNC: 28 U/L (ref 2–50)
ANION GAP SERPL CALC-SCNC: 11 MMOL/L (ref 7–16)
AST SERPL-CCNC: 31 U/L (ref 12–45)
BILIRUB SERPL-MCNC: 0.5 MG/DL (ref 0.1–1.5)
BUN SERPL-MCNC: 22 MG/DL (ref 8–22)
CALCIUM ALBUM COR SERPL-MCNC: 9.4 MG/DL (ref 8.5–10.5)
CALCIUM SERPL-MCNC: 9.6 MG/DL (ref 8.5–10.5)
CHLORIDE SERPL-SCNC: 107 MMOL/L (ref 96–112)
CO2 SERPL-SCNC: 23 MMOL/L (ref 20–33)
CREAT SERPL-MCNC: 0.98 MG/DL (ref 0.5–1.4)
EKG IMPRESSION: NORMAL
ERYTHROCYTE [DISTWIDTH] IN BLOOD BY AUTOMATED COUNT: 42.4 FL (ref 35.9–50)
GFR SERPLBLD CREATININE-BSD FMLA CKD-EPI: 90 ML/MIN/1.73 M 2
GLOBULIN SER CALC-MCNC: 2.6 G/DL (ref 1.9–3.5)
GLUCOSE SERPL-MCNC: 94 MG/DL (ref 65–99)
HCT VFR BLD AUTO: 41.3 % (ref 42–52)
HGB BLD-MCNC: 13.9 G/DL (ref 14–18)
INR PPP: 0.98 (ref 0.87–1.13)
MCH RBC QN AUTO: 30.4 PG (ref 27–33)
MCHC RBC AUTO-ENTMCNC: 33.7 G/DL (ref 32.3–36.5)
MCV RBC AUTO: 90.4 FL (ref 81.4–97.8)
PLATELET # BLD AUTO: 237 K/UL (ref 164–446)
PMV BLD AUTO: 9.9 FL (ref 9–12.9)
POTASSIUM SERPL-SCNC: 4.2 MMOL/L (ref 3.6–5.5)
PROT SERPL-MCNC: 6.9 G/DL (ref 6–8.2)
PROTHROMBIN TIME: 12.9 SEC (ref 12–14.6)
RBC # BLD AUTO: 4.57 M/UL (ref 4.7–6.1)
SODIUM SERPL-SCNC: 141 MMOL/L (ref 135–145)
WBC # BLD AUTO: 5.9 K/UL (ref 4.8–10.8)

## 2023-07-20 PROCEDURE — 80053 COMPREHEN METABOLIC PANEL: CPT

## 2023-07-20 PROCEDURE — 93005 ELECTROCARDIOGRAM TRACING: CPT | Performed by: INTERNAL MEDICINE

## 2023-07-20 PROCEDURE — 160046 HCHG PACU - 1ST 60 MINS PHASE II

## 2023-07-20 PROCEDURE — 700117 HCHG RX CONTRAST REV CODE 255: Performed by: INTERNAL MEDICINE

## 2023-07-20 PROCEDURE — 36415 COLL VENOUS BLD VENIPUNCTURE: CPT

## 2023-07-20 PROCEDURE — 160002 HCHG RECOVERY MINUTES (STAT)

## 2023-07-20 PROCEDURE — 99152 MOD SED SAME PHYS/QHP 5/>YRS: CPT | Performed by: INTERNAL MEDICINE

## 2023-07-20 PROCEDURE — C1769 GUIDE WIRE: HCPCS

## 2023-07-20 PROCEDURE — 700101 HCHG RX REV CODE 250

## 2023-07-20 PROCEDURE — 160036 HCHG PACU - EA ADDL 30 MINS PHASE I

## 2023-07-20 PROCEDURE — 93458 L HRT ARTERY/VENTRICLE ANGIO: CPT | Mod: 26 | Performed by: INTERNAL MEDICINE

## 2023-07-20 PROCEDURE — 85027 COMPLETE CBC AUTOMATED: CPT

## 2023-07-20 PROCEDURE — 700111 HCHG RX REV CODE 636 W/ 250 OVERRIDE (IP)

## 2023-07-20 PROCEDURE — 160035 HCHG PACU - 1ST 60 MINS PHASE I

## 2023-07-20 PROCEDURE — 93010 ELECTROCARDIOGRAM REPORT: CPT | Mod: 59 | Performed by: INTERNAL MEDICINE

## 2023-07-20 PROCEDURE — 85610 PROTHROMBIN TIME: CPT

## 2023-07-20 RX ORDER — MIDAZOLAM HYDROCHLORIDE 1 MG/ML
INJECTION INTRAMUSCULAR; INTRAVENOUS
Status: COMPLETED
Start: 2023-07-20 | End: 2023-07-20

## 2023-07-20 RX ORDER — LIDOCAINE HYDROCHLORIDE 20 MG/ML
INJECTION, SOLUTION INFILTRATION; PERINEURAL
Status: COMPLETED
Start: 2023-07-20 | End: 2023-07-20

## 2023-07-20 RX ORDER — HEPARIN SODIUM 1000 [USP'U]/ML
INJECTION, SOLUTION INTRAVENOUS; SUBCUTANEOUS
Status: COMPLETED
Start: 2023-07-20 | End: 2023-07-20

## 2023-07-20 RX ORDER — CLOBETASOL PROPIONATE 0.5 MG/G
1 OINTMENT TOPICAL
COMMUNITY

## 2023-07-20 RX ORDER — VERAPAMIL HYDROCHLORIDE 2.5 MG/ML
INJECTION, SOLUTION INTRAVENOUS
Status: COMPLETED
Start: 2023-07-20 | End: 2023-07-20

## 2023-07-20 RX ORDER — HEPARIN SODIUM 200 [USP'U]/100ML
INJECTION, SOLUTION INTRAVENOUS
Status: COMPLETED
Start: 2023-07-20 | End: 2023-07-20

## 2023-07-20 RX ADMIN — NITROGLYCERIN 10 ML: 20 INJECTION INTRAVENOUS at 09:09

## 2023-07-20 RX ADMIN — IOHEXOL 70 ML: 350 INJECTION, SOLUTION INTRAVENOUS at 09:53

## 2023-07-20 RX ADMIN — FENTANYL CITRATE 100 MCG: 50 INJECTION, SOLUTION INTRAMUSCULAR; INTRAVENOUS at 09:45

## 2023-07-20 RX ADMIN — HEPARIN SODIUM 2000 UNITS: 200 INJECTION, SOLUTION INTRAVENOUS at 09:09

## 2023-07-20 RX ADMIN — MIDAZOLAM 2 MG: 1 INJECTION, SOLUTION INTRAMUSCULAR; INTRAVENOUS at 09:45

## 2023-07-20 RX ADMIN — LIDOCAINE HYDROCHLORIDE: 20 INJECTION, SOLUTION INFILTRATION; PERINEURAL at 09:09

## 2023-07-20 RX ADMIN — VERAPAMIL HYDROCHLORIDE 5 MG: 2.5 INJECTION, SOLUTION INTRAVENOUS at 09:09

## 2023-07-20 RX ADMIN — HEPARIN SODIUM: 1000 INJECTION, SOLUTION INTRAVENOUS; SUBCUTANEOUS at 09:09

## 2023-07-20 ASSESSMENT — FIBROSIS 4 INDEX: FIB4 SCORE: 1.17

## 2023-07-20 ASSESSMENT — ENCOUNTER SYMPTOMS
PND: 0
CLAUDICATION: 0
ORTHOPNEA: 0
PALPITATIONS: 0

## 2023-07-20 NOTE — OR NURSING
Assumed care in pre op . Wife Ladonna at bedside. Patient is alert oriented x 4 denies any cardiac sx .  For heartcath with poss. PCI with DR. Gonzalez.  IV started at right FA g18 and blood sent for cbc , cmp and PT . Ekg Dan informed.  Diagnostic /therapeutic surgical consent/ procedure verified and signed by the patient.   Waiting for MD .

## 2023-07-20 NOTE — OR NURSING
Patient recovered well post op. A&Ox4. VSS, room air. Surgical sites CDI. TR band completed as ordered. Surgical pain managed. Patient able to drink fluids without Nausea and vomiting. PT belongings in phase two. Spouse updated and discussed plan of care. Report called to Rigoberto TORRES.

## 2023-07-20 NOTE — PROCEDURES
Cardiac Catheterization Laboratory Procedure Note    DATE: 7/20/2023    : Adolfo Gonzalez MD    PROCEDURES PERFORMED:  Left heart catheterization  Coronary angiography  Moderate conscious sedation    INDICATIONS:  The patient is a 57-year-old gentleman referred for cardiac catheterization to evaluate exertional chest pain with a CT coronary angiogram suggesting a proximal right coronary artery chronic total occlusion.    CONSENT:  The complete alternatives, risks, and benefits of the procedure were explained to the patient.  Signed informed consent was obtained and placed in the chart prior to the procedure.  A timeout was performed prior to beginning the procedure.    MEDICATIONS:  Lidocaine  Fentanyl  Midazolam  Nitroglycerin  Verapamil  Heparin    MODERATE CONSCIOUS SEDATION:  I personally supervised the administration of moderate conscious sedation by the nursing staff for 18 minutes.  Sedation start time: 9:36 AM  Sedation end time: 9:54 AM    CONTRAST: Omnipaque 70 cc    ACCESS: 6-Malaysian Glidesheath in the right radial artery.    ESTIMATED BLOOD LOSS: 10 cc    COMPLICATIONS: None    DESCRIPTION OF PROCEDURE:  The patient was brought to the cardiac catheterization laboratory in the fasting state.  The skin over the right wrist was prepped and draped in the usual sterile fashion.  Lidocaine infiltration was used to anesthetize the tissue over the right radial artery.  Using the micropuncture technique, a 6-Malaysian Glidesheath was inserted in the right radial artery.  A 5-Malaysian Anjel catheter was then advanced over a standard J-wire into the left ventricular cavity where it was gently aspirated, flushed, and then withdrawn across the aortic valve with sequential pressures measured.  This catheter was then used to engage the ostium of the right coronary artery and cineangiograms were obtained in multiple projections for complete evaluation of the right coronary system.  This catheter was then used to  engage the ostium of the left main coronary artery and cineangiograms were obtained in multiple projections for complete evaluation of the left coronary system.  At the completion of the case all wires, catheters, and sheaths were removed.  A TR band was placed using the patent hemostasis technique.    HEMODYNAMICS:   Aortic pressure: 129/74 mmHg  Pre-A wave pressure: 8  No significant aortic gradient on pullback    CORONARY ANGIOGRAPHY:  The left main coronary artery is patent and bifurcates into the left anterior descending and left circumflex coronary arteries.  The left anterior descending coronary artery is a large, transapical vessel with proximal 40% disease prior to the takeoff of the first diagonal branch, mid luminal irregularities, and a long mid-distal 20% lesion within a mild myocardial bridge.  It supplies a moderate first diagonal branch with proximal 90% disease and a small second diagonal branch with proximal 70% disease.  The left circumflex coronary artery is a large, nondominant vessel with proximal luminal irregularities prior to supplying a large, bifurcating first obtuse marginal branch with no angiographically significant.  The ongoing AV groove vessel is small and supplies a small second obtuse marginal branch.  The right coronary artery is a large, dominant vessel that is chronically occluded proximally.  The distal vessel refills by septal and epicardial collaterals from the left anterior descending coronary artery and appears to be relatively disease-free with large posterolateral and posterior descending branches.    IMPRESSION:  Severe obstructive one-vessel coronary artery disease with a chronic total occlusion of the proximal right coronary artery.  Normal left heart filling pressures.    RECOMMENDATIONS:  Recovering post procedure area.  TR band release per protocol.  Aggressive cardiovascular risk factor management.  Consider  intervention if the patient has refractory anginal  symptoms multiple antianginal medications.    NOTIFICATION:  The patient's family was notified of the results of his cardiac catheterization.

## 2023-07-20 NOTE — H&P
Cardiology History & Physical Note    Date of Service  7/20/2023    Primary Care Physician  Pcp Pt States None    Chief Complaint    Elective admission to undergo coronary angiography secondary to coronary CTA showing obstructive lesions results and exertional chest pain    History of Presenting Illness  Isaias Coyle is a 57 y.o. male with no prior cardiac, recent exertional chest pain, underwent a CTA heart showed obstructive lesions, has significant family history of CAD &  MI, father had MI at age of 53, paternal grandfather had MI at age 40 who presented 7/20/2023 to undergo elective coronary angiography for further evaluation and treatment.        Review of Systems  Review of Systems   Cardiovascular:  Negative for chest pain, palpitations, orthopnea, claudication, leg swelling and PND.       Past Medical History   has a past medical history of Arthritis (07/19/2023), Breath shortness (07/19/2023), CAD (coronary artery disease) (07/19/2023), and Pneumonia (07/19/2023).    Surgical History   has a past surgical history that includes other orthopedic surgery (Right, 07/19/2023) and other orthopedic surgery (Left, 07/19/2023).     Family History  family history includes Heart Attack in his father and paternal grandfather; Hypertension in his mother.   Family history reviewed with patient. There is no family history that is pertinent to the chief complaint.     Social History   reports that he has never smoked. He has never used smokeless tobacco. He reports current drug use. He reports that he does not drink alcohol.    Allergies  Allergies   Allergen Reactions    Morphine Vomiting and Nausea       Medications  Prior to Admission Medications   Prescriptions Last Dose Informant Patient Reported? Taking?   aspirin 81 MG EC tablet 7/20/2023 at 0530 Patient No No   Sig: Take 1 Tablet by mouth every day.   atorvastatin (LIPITOR) 40 MG Tab 7/19/2023 at PM Patient No No   Sig: Take 1 Tablet by mouth every  evening.   clobetasol (TEMOVATE) 0.05 % Ointment 7/20/2023 at AM Patient Yes Yes   Sig: Apply 1 Application topically 2 times daily with meals as needed.   isosorbide mononitrate SR (IMDUR) 30 MG TABLET SR 24 HR 7/20/2023 at 0530 Patient No No   Sig: Take 1 Tablet by mouth every morning.      Facility-Administered Medications: None       Physical Exam  Temp:  [36.2 °C (97.1 °F)] 36.2 °C (97.1 °F)  Pulse:  [56] 56  Resp:  [18] 18  BP: (110)/(75) 110/75  SpO2:  [97 %] 97 %  Blood Pressure: 110/75   Temperature: 36.2 °C (97.1 °F)   Pulse: (!) 56   Respiration: 18   Pulse Oximetry: 97 %       Physical Exam  Cardiovascular:      Rate and Rhythm: Normal rate and regular rhythm.   Pulmonary:      Effort: Pulmonary effort is normal.   Skin:     General: Skin is warm.   Neurological:      Mental Status: He is alert. Mental status is at baseline.   Psychiatric:         Mood and Affect: Mood normal.         Laboratory:  Recent Labs     07/20/23  0730   WBC 5.9   RBC 4.57*   HEMOGLOBIN 13.9*   HEMATOCRIT 41.3*   MCV 90.4   MCH 30.4   MCHC 33.7   RDW 42.4   PLATELETCT 237   MPV 9.9     Recent Labs     07/20/23  0730   SODIUM 141   POTASSIUM 4.2   CHLORIDE 107   CO2 23   GLUCOSE 94   BUN 22   CREATININE 0.98   CALCIUM 9.6     Recent Labs     07/20/23  0730   ALTSGPT 28   ASTSGOT 31   ALKPHOSPHAT 56   TBILIRUBIN 0.5   GLUCOSE 94     Recent Labs     07/20/23  0730   INR 0.98     CTA heart 7/3/2023  1.  Long segment proximal to mid dominant right coronary artery occlusion. There is reconstitution of flow in the mid right coronary artery which is patent distally. The occlusion starts about 8 mm from the ostium.  2.  Mixed plaque within the proximal and mid LAD which is mostly nonobstructive although there may be focal up to 50% stenosis. There is also a an obstructive plaque within the proximal second diagonal.  3.  No significant circumflex stenosis.        Calcium Score of 1-99 AND <75th  percentile:    Assessment/Plan:    -Exertional chest pain  -Coronary CTA 7/3/2023 revealed obstructive lesions  -Significant family history notable for premature CAD in his father and paternal grandfather        Recommendations  -Plan for left heart cath with potential coronary intervention  -N.p.o.  -No contrast allergy  -The risks, benefits, and alternatives to coronary angiography with IV sedation were discussed in great detail. Specific risks mentioned include bleeding, infection, kidney damage, allergic reaction, cardiac perforation with possible tamponade requiring pericardiocentesis or possibly open heart surgery. In addition, we discussed that 10% of patients will experience small to moderate bruising at the site of the arterial puncture. Lastly, the risks of heart attack, stroke and death were discussed; the risk of major complications such as heart attack or stroke caused by the angiogram is approximately 1%; the risk of death is approximately 1 in 1000. The patient verbalized understanding of the potential complications and wishes to proceed with this procedure.

## 2023-07-20 NOTE — DISCHARGE INSTRUCTIONS
HOME CARE INSTRUCTIONS    ACTIVITY: Rest and take it easy for the first 24 hours.  A responsible adult is recommended to remain with you during that time.  It is normal to feel sleepy.  We encourage you to not do anything that requires balance, judgment or coordination.    FOR 24 HOURS DO NOT:  Drive, operate machinery or run household appliances.  Drink beer or alcoholic beverages.  Make important decisions or sign legal documents.    SPECIAL INSTRUCTIONS: Limit wrist movement for 48 hours  POST ANGIOGRAM    General Care Instructions  · Maintain a bandage over the incision site for 24 hours. It's normal to find a small bruise or dime-sized lump at the insertion site. This should disappear within a few weeks.  · Do not apply lotions or powders to the site. Do not immerse the catheter insertion site in water (bathtub/swimming) for five days. It is ok to shower 24 hours after the procedure.  · You may resume your normal diet immediately; on the day of your procedure, drink 6-10 glasses of water to help flush the contrast liquid out of your system.  · If the doctor inserted the catheter in through your groin:  o Walking short distances on a flat surface is OK. Limit going up/down stairs for the first 2 days.  o DO NOT do yard work, drive, squat, lift heavy objects, or play sports for 2 days; or until your health care provider tells you it is OK.  · If the doctor inserted the catheter in your arm:  o For 24 hours, DO NOT lift anything heavier than 10 pounds (approximately a gallon of milk). DO NOT do any heavy pushing, pulling, or twisting.    Medications  · If your current medications need to be changed, you will be provided with an updated list of your medications prior to discharge.  · If you take warfarin (Coumadin), resume taking your usual dose the evening after the procedure.  · DO NOT STOP taking prescribed blood thinning (anti-platelet) medications unless instructed by your cardiologist. These medications  include:  o Aspirin, Clopidogrel (Plavix), Ticagrelor (Brilinta), or Prasugrel (Effient)  · If you take one of the following anticoagulants, RESUME 24 HOURS after your procedure:  o Apixiban (Eliquis), Rivaroxaban (Xarelto), Dabigatran (Pradaxa), Edoxaban (Savaysa)  · If you take metformin (Glucophage), RESUME 48 HOURS after your procedure.    When to call your healthcare provider  Call your cardiologist right away at [phone] if you have any of the following:  - Problems/Concerns taking any of your prescribed heart medicines.  - The insertion site has increasing pain, swelling, redness, bleeding, or drainage.  - Your arm or leg below where the insertion site changes color, is cool, or is numb.  - You have chest pain or shortness of breath that does not go away with rest.  - Your pulse feels irregular -- very slow (less than 60 beats/minute) or very fast (over 100 beats/minute).  - You have dizziness, fainting, or you are very tired.  - You are coughing up blood or yellow or green mucus.  - You have chills or a fever over 101°F (38.3°C).  - If there is bleeding at the catheter insertion site, apply pressure for 10 minutes. If bleeding persists, call 911, and continue to hold pressure until advanced medical support arrives.    DIET: To avoid nausea, slowly advance diet as tolerated, avoiding spicy or greasy foods for the first day.  Add more substantial food to your diet according to your physician's instructions.  INCREASE FLUIDS AND FIBER TO AVOID CONSTIPATION.    MEDICATIONS: Resume taking daily medication.  Take prescribed pain medication with food.  If no medication is prescribed, you may take non-aspirin pain medication if needed.  PAIN MEDICATION CAN BE VERY CONSTIPATING.  Take a stool softener or laxative such as senokot, pericolace, or milk of magnesia if needed.    A follow-up appointment should be arranged with your doctor; call to schedule.    You should CALL YOUR PHYSICIAN if you develop:  Fever  greater than 101 degrees F.  Pain not relieved by medication, or persistent nausea or vomiting.  Excessive bleeding (blood soaking through dressing) or unexpected drainage from the wound.  Extreme redness or swelling around the incision site, drainage of pus or foul smelling drainage.  Inability to urinate or empty your bladder within 8 hours.  Problems with breathing or chest pain.    You should call 911 if you develop problems with breathing or chest pain.  If you are unable to contact your doctor or surgical center, you should go to the nearest emergency room or urgent care center.  Physician's telephone #: [phone]    MILD FLU-LIKE SYMPTOMS ARE NORMAL.  YOU MAY EXPERIENCE GENERALIZED MUSCLE ACHES, THROAT IRRITATION, HEADACHE AND/OR SOME NAUSEA.    If any questions arise, call your doctor.  If your doctor is not available, please feel free to call the Surgical Center at [phone].  The Center is open Monday through Friday from 7AM to 7PM.      A registered nurse may call you a few days after your surgery to see how you are doing after your procedure.    You may also receive a survey in the mail within the next two weeks and we ask that you take a few moments to complete the survey and return it to us.  Our goal is to provide you with very good care and we value your comments.

## 2023-07-25 ENCOUNTER — TELEPHONE (OUTPATIENT)
Dept: CARDIOLOGY | Facility: MEDICAL CENTER | Age: 58
End: 2023-07-25
Payer: COMMERCIAL

## 2023-07-25 NOTE — TELEPHONE ENCOUNTER
Per BN schedule pt 08/22 at 4:20 if spot avail. LVM for pt to call back and schedule f/v. /cg 07/25/23

## 2023-07-27 ENCOUNTER — TELEPHONE (OUTPATIENT)
Dept: CARDIOLOGY | Facility: MEDICAL CENTER | Age: 58
End: 2023-07-27
Payer: COMMERCIAL

## 2023-07-27 NOTE — TELEPHONE ENCOUNTER
Phone Number Called: [phone]    Call outcome: Spoke to patient regarding message below.    Message: Return call received from patient    Patient has questions after his angiogram that he would like answered. Patient wanted confirmation on plan of care and results of the heart cath. Discussed with patient BN note. Patient thankful for discussion. Advised patient that BN will go over plan of care in detail at appointment and let us know if anything changes. Patient verbalizes understanding. Patient scheduled for appointment.

## 2023-07-27 NOTE — TELEPHONE ENCOUNTER
Phone Number Called: [phone]    Call outcome: Left detailed message for patient. Informed to call back with any additional questions.    Message: Called to inform patient of BN recommendation for follow up. Will send follow up [facility] message as well.       ----- Message from Aurea Regan R.N. sent at 7/24/2023  1:09 PM PDT -----    ----- Message -----  From: Adolfo Gonzalez M.D.  Sent: 7/24/2023  12:28 PM PDT  To: Aurea Regan R.N.    I'm going to be opening a clinic day on August 29 (my cath lab day is shifting to Aug).    Can you please get this patient in to see me then, thanks.

## 2023-08-29 ENCOUNTER — OFFICE VISIT (OUTPATIENT)
Dept: CARDIOLOGY | Facility: MEDICAL CENTER | Age: 58
End: 2023-08-29
Attending: INTERNAL MEDICINE
Payer: COMMERCIAL

## 2023-08-29 VITALS
DIASTOLIC BLOOD PRESSURE: 92 MMHG | WEIGHT: 154 LBS | OXYGEN SATURATION: 98 % | BODY MASS INDEX: 21.56 KG/M2 | SYSTOLIC BLOOD PRESSURE: 120 MMHG | HEART RATE: 56 BPM | RESPIRATION RATE: 16 BRPM | HEIGHT: 71 IN

## 2023-08-29 DIAGNOSIS — I20.9 ANGINA PECTORIS (HCC): ICD-10-CM

## 2023-08-29 DIAGNOSIS — I25.119 CORONARY ARTERY DISEASE INVOLVING NATIVE CORONARY ARTERY OF NATIVE HEART WITH ANGINA PECTORIS (HCC): ICD-10-CM

## 2023-08-29 DIAGNOSIS — E78.5 DYSLIPIDEMIA: ICD-10-CM

## 2023-08-29 PROBLEM — R07.9 CHEST PAIN IN ADULT: Status: RESOLVED | Noted: 2023-06-08 | Resolved: 2023-08-29

## 2023-08-29 PROCEDURE — 3080F DIAST BP >= 90 MM HG: CPT | Performed by: INTERNAL MEDICINE

## 2023-08-29 PROCEDURE — 99214 OFFICE O/P EST MOD 30 MIN: CPT | Performed by: INTERNAL MEDICINE

## 2023-08-29 PROCEDURE — 3074F SYST BP LT 130 MM HG: CPT | Performed by: INTERNAL MEDICINE

## 2023-08-29 PROCEDURE — 99211 OFF/OP EST MAY X REQ PHY/QHP: CPT | Performed by: INTERNAL MEDICINE

## 2023-08-29 RX ORDER — METOPROLOL SUCCINATE 25 MG/1
25 TABLET, EXTENDED RELEASE ORAL EVERY EVENING
Qty: 90 TABLET | Refills: 3 | Status: SHIPPED | OUTPATIENT
Start: 2023-08-29

## 2023-08-29 ASSESSMENT — FIBROSIS 4 INDEX: FIB4 SCORE: 1.38

## 2023-08-29 NOTE — PROGRESS NOTES
CARDIOLOGY  CONSULTATION NOTE      Date of Visit: 8/29/2023    Primary Care Provider: Pcp Pt States None  Referring Provider: No ref. provider found    Patient Name: Isaias Coyle  YOB: 1965  MRN: [id number]     Reason for Visit:    Consultation     Patient Story:   Isaias Coyle is a 57 year-old gentleman with a past medical history of hyperlipidemia and coronary artery disease with a known  of the proximal right coronary artery.  Briefly, he is a primary patient of Dr. Bonner.  He had a CTCA performed to evaluate classic anginal symptoms, which suggested a proximal to mid RCA .  Therefore, he underwent coronary angiography on 7/20/2023, which confirmed a proximal RCA  and also demonstrated angiographically severe disease in the first and second diagonal branches.  He was subsequently started on isosorbide and presents today for a consultation regarding his .    Today, he reports generally feeling well.  He has not had significant anginal symptoms recently, but he has been restricting his activity following his angiogram.  Previously, he participated in very active sports such as mountain biking, which she has not been doing recently.  He was a little lightheaded initially after starting isosorbide, but this has resolved.  He also notes that he developed significant bruising and swelling at his right radial artery access site few days after his procedure when he was golfing, this resolved with compression.     Medications and Allergies:     Current Outpatient Medications   Medication Sig Dispense Refill    metoprolol SR (TOPROL XL) 25 MG TABLET SR 24 HR Take 1 Tablet by mouth every evening. 90 Tablet 3    clobetasol (TEMOVATE) 0.05 % Ointment Apply 1 Application topically 2 times daily with meals as needed.      atorvastatin (LIPITOR) 40 MG Tab Take 1 Tablet by mouth every evening. 90 Tablet 3    isosorbide mononitrate SR (IMDUR) 30 MG TABLET SR 24 HR Take 1  Tablet by mouth every morning. 90 Tablet 0    aspirin 81 MG EC tablet Take 1 Tablet by mouth every day. 100 Tablet 3     No current facility-administered medications for this visit.     Allergies   Allergen Reactions    Morphine Vomiting and Nausea      Medical Decision Making:   We reviewed his angiogram in detail today and had a long discussion regarding the indications for  intervention.  We specifically described that  intervention has not been shown to clearly reduce mortality risk for major MI risk over a short period of time, however, the time duration of studies on CTOs is limited.  We further discussed that  intervention has been shown to improve symptoms and quality of life in patients with refractory symptoms despite maximally tolerated antianginal therapy.  We also discussed that, while still considered safe,  interventions are associated with a higher risk of serious/life-threatening complications, in particular, there is a high risk of coronary perforation, which can rarely result in fatal cardiac tamponade.    Following his discussion, he wished to start increasing his exercise levels and to reassess his symptoms following this.  I did recommend starting metoprolol, which may improve his symptoms as well as reduce his risk of ventricular arrhythmias with exercise.  I also instructed him to stop any exercises that cause significant chest discomfort.  He will follow-up in 2-3 months to review his symptoms after starting metoprolol as well as repeat lipid levels.    Follow Up  2-3 months     Cardiac Studies and Procedures:   Echocardiography  TTE (6/9/2023)  Normal left ventricular chamber size and wall thickness.  Normal left ventricular systolic function.  The left ventricular ejection fraction is visually estimated to be 55%.  Normal right ventricular size. Normal right ventricular systolic   function.  There is mild pulmonic insufficiency    Coronary Angiography/Cardiac  "Catheterization  CAG (7/20/2023)  The left main coronary artery is patent and bifurcates into the left anterior descending and left circumflex coronary arteries.  The left anterior descending coronary artery is a large, transapical vessel with proximal 40% disease prior to the takeoff of the first diagonal branch, mid luminal irregularities, and a long mid-distal 20% lesion within a mild myocardial bridge.  It supplies a moderate first diagonal branch with proximal 90% disease and a small second diagonal branch with proximal 70% disease.  The left circumflex coronary artery is a large, nondominant vessel with proximal luminal irregularities prior to supplying a large, bifurcating first obtuse marginal branch with no angiographically significant.  The ongoing AV groove vessel is small and supplies a small second obtuse marginal branch.  The right coronary artery is a large, dominant vessel that is chronically occluded proximally.  The distal vessel refills by septal and epicardial collaterals from the left anterior descending coronary artery and appears to be relatively disease-free with large posterolateral and posterior descending branches.    CT/MRI  CTCA (7/3/2023)  1.  Long segment proximal to mid dominant right coronary artery occlusion. There is reconstitution of flow in the mid right coronary artery which is patent distally. The occlusion starts about 8 mm from the ostium.  2.  Mixed plaque within the proximal and mid LAD which is mostly nonobstructive although there may be focal up to 50% stenosis. There is also a an obstructive plaque within the proximal second diagonal.  3.  No significant circumflex stenosis.     Vital Signs:   BP (!) 120/92 (BP Location: Left arm, Patient Position: Sitting)   Pulse (!) 56   Resp 16   Ht 1.803 m (5' 11\")   Wt 69.9 kg (154 lb)   SpO2 98%    BP Readings from Last 4 Encounters:   08/29/23 (!) 120/92   07/20/23 130/65   07/03/23 121/81   06/08/23 124/84     Wt Readings " "from Last 4 Encounters:   08/29/23 69.9 kg (154 lb)   07/20/23 70.5 kg (155 lb 6.8 oz)   06/08/23 64.9 kg (143 lb)   02/27/19 72.6 kg (160 lb)     Body mass index is 21.48 kg/m².     Laboratories:   Lipids  Lab Results   Component Value Date/Time     (H) 06/28/2023 06:45 AM       Lab Results   Component Value Date/Time    HDL 60 06/28/2023 06:45 AM       Lab Results   Component Value Date/Time    TRIGLYCERIDE 81 06/28/2023 06:45 AM       Lab Results   Component Value Date/Time    CHOLSTRLTOT 200 (H) 06/28/2023 06:45 AM       No components found for: \"LPA\"      Chemistries  Lab Results   Component Value Date/Time    CREATININE 0.98 07/20/2023 07:30 AM    CREATININE 1.17 06/28/2023 06:45 AM    CREATININE 1.23 02/27/2019 02:08 PM     Lab Results   Component Value Date/Time    BUN 22 07/20/2023 07:30 AM    BUN 23 (H) 06/28/2023 06:45 AM    BUN 15 02/27/2019 02:08 PM     Lab Results   Component Value Date/Time    POTASSIUM 4.2 07/20/2023 07:30 AM    POTASSIUM 4.4 06/28/2023 06:45 AM    POTASSIUM 3.4 (L) 02/27/2019 02:08 PM     Lab Results   Component Value Date/Time    SODIUM 141 07/20/2023 07:30 AM    SODIUM 142 06/28/2023 06:45 AM    SODIUM 141 02/27/2019 02:08 PM     Lab Results   Component Value Date/Time    GLUCOSE 94 07/20/2023 07:30 AM    GLUCOSE 103 (H) 06/28/2023 06:45 AM    GLUCOSE 121 (H) 02/27/2019 02:08 PM     Lab Results   Component Value Date/Time    ASTSGOT 31 07/20/2023 07:30 AM    ASTSGOT 26 06/28/2023 06:45 AM    ASTSGOT 48 (H) 02/27/2019 02:08 PM     Lab Results   Component Value Date/Time    ALTSGPT 28 07/20/2023 07:30 AM    ALTSGPT 28 06/28/2023 06:45 AM    ALTSGPT 92 (H) 02/27/2019 02:08 PM     Lab Results   Component Value Date/Time    ALKPHOSPHAT 56 07/20/2023 07:30 AM    ALKPHOSPHAT 57 06/28/2023 06:45 AM    ALKPHOSPHAT 65 02/27/2019 02:08 PM     Lab Results   Component Value Date/Time    HBA1C 5.0 06/28/2023 06:45 AM     No results found for: \"TSH\"  No results found for: \"NTPROBNP\"  No " "results found for: \"TROPONINT\"    Blood Counts  Lab Results   Component Value Date/Time    HEMOGLOBIN 13.9 (L) 07/20/2023 07:30 AM    HEMOGLOBIN 14.3 06/28/2023 06:45 AM    HEMOGLOBIN 15.6 02/27/2019 02:08 PM     Lab Results   Component Value Date/Time    PLATELETCT 237 07/20/2023 07:30 AM    PLATELETCT 236 06/28/2023 06:45 AM    PLATELETCT 273 02/27/2019 02:08 PM     Lab Results   Component Value Date/Time    WBC 5.9 07/20/2023 07:30 AM    WBC 5.4 06/28/2023 06:45 AM    WBC 6.4 02/27/2019 02:08 PM        Physical Examination:   General: Well-appearing, no acute distress  Eyes: Extraocular movements intact, anicteric  Neck: Full range of motion, no gross jugular venous distension  Pulmonary: Normal respiratory effort, no distress  Cardiovascular: Borderline bradycardic, regular  Gastrointestinal: Thin, nonobese  Extremities: Moves all extremities normally, no gross lower extremity edema, normal 2+ right radial pulse  Neurological: Alert and oriented, normal speech  Psychiatric: Normal affect, normal judgment     Past History:   Past Medical History  The patient's past medical history was reviewed.  See HPI and self-reported patient medical history form for pertinent medical history to consultation.    Past Social History  The patient's social history was reviewed.  See [facility] self-reported patient medical history form for pertinent social history to consultation.    Past Family History  The patient's family history was reviewed.  See HPI self-reported patient medical history form for pertinent family history to consultation.    Review of Systems  A pertinent cardiac review of systems was performed and was otherwise unremarkable except as per HPI and self-reported patient medical history form.        Adolfo Gonzalez MD, [facility]  Interventional Cardiology  [facility], [address]  [address] [address]  Phone: [phone]  Fax: " [phone]

## 2023-10-27 ENCOUNTER — TELEPHONE (OUTPATIENT)
Dept: CARDIOLOGY | Facility: MEDICAL CENTER | Age: 58
End: 2023-10-27
Payer: COMMERCIAL

## 2023-10-27 DIAGNOSIS — I25.119 CORONARY ARTERY DISEASE INVOLVING NATIVE CORONARY ARTERY OF NATIVE HEART WITH ANGINA PECTORIS (HCC): ICD-10-CM

## 2023-10-27 DIAGNOSIS — I20.9 ANGINA PECTORIS (HCC): ICD-10-CM

## 2023-10-27 NOTE — TELEPHONE ENCOUNTER
BN    Received request via: Patient    Was the patient seen in the last year in this department? Yes    Does the patient have an active prescription (recently filled or refills available) for medication(s) requested? Yes. Refill request has been refused in Epic. Contacted pharmacy and called in most recent prescription.    Does the patient have [facility] and need 100 day supply (blood pressure, diabetes and cholesterol meds only)? Patient does not have SCP    Thank you    -Serge IRVING

## 2023-10-31 DIAGNOSIS — I20.9 ANGINA PECTORIS (HCC): ICD-10-CM

## 2023-10-31 DIAGNOSIS — I25.119 CORONARY ARTERY DISEASE INVOLVING NATIVE CORONARY ARTERY OF NATIVE HEART WITH ANGINA PECTORIS (HCC): ICD-10-CM

## 2023-11-01 RX ORDER — ISOSORBIDE MONONITRATE 30 MG/1
30 TABLET, EXTENDED RELEASE ORAL EVERY MORNING
Qty: 90 TABLET | Refills: 3 | Status: SHIPPED | OUTPATIENT
Start: 2023-11-01

## 2023-11-03 RX ORDER — ISOSORBIDE MONONITRATE 30 MG/1
30 TABLET, EXTENDED RELEASE ORAL EVERY MORNING
Qty: 90 TABLET | Refills: 0 | OUTPATIENT
Start: 2023-11-03

## 2023-11-03 RX ORDER — ATORVASTATIN CALCIUM 40 MG/1
40 TABLET, FILM COATED ORAL NIGHTLY
Qty: 90 TABLET | Refills: 3 | OUTPATIENT
Start: 2023-11-03

## 2023-11-03 NOTE — TELEPHONE ENCOUNTER
Upon chart review, both medications previously filled already.  Imdur 11/1/2023 and atorvastatin 7/2023.    Medication refused.

## 2023-11-13 DIAGNOSIS — I20.9 ANGINA PECTORIS (HCC): ICD-10-CM

## 2023-11-13 DIAGNOSIS — I25.119 CORONARY ARTERY DISEASE INVOLVING NATIVE CORONARY ARTERY OF NATIVE HEART WITH ANGINA PECTORIS (HCC): ICD-10-CM

## 2023-11-15 RX ORDER — ATORVASTATIN CALCIUM 40 MG/1
40 TABLET, FILM COATED ORAL NIGHTLY
Qty: 90 TABLET | Refills: 3 | Status: SHIPPED | OUTPATIENT
Start: 2023-11-15 | End: 2023-11-29 | Stop reason: SDUPTHER

## 2023-11-15 NOTE — TELEPHONE ENCOUNTER
Is the patient due for a refill? Yes/ changed pharmacy    Was the patient seen the past year? Yes    Date of last office visit: 8/29/23    Does the patient have an upcoming appointment?  Yes   If yes, When? 12/1/23    Provider to refill:BN    Does the patients insurance require a 100 day supply?  No

## 2023-11-17 ENCOUNTER — PATIENT MESSAGE (OUTPATIENT)
Dept: CARDIOLOGY | Facility: MEDICAL CENTER | Age: 58
End: 2023-11-17
Payer: COMMERCIAL

## 2023-11-18 NOTE — PATIENT COMMUNICATION
Attempted to call patient in regards to an upcoming appointment with Dr. Gonzalez. LVM letting patient know to call us back. Attempting to verify where lab work is done. Patient does have  lab work to do.     Appt 12/1/23     Pending call back.

## 2023-11-25 ENCOUNTER — HOSPITAL ENCOUNTER (OUTPATIENT)
Dept: LAB | Facility: MEDICAL CENTER | Age: 58
End: 2023-11-25
Attending: INTERNAL MEDICINE
Payer: COMMERCIAL

## 2023-11-25 DIAGNOSIS — I20.9 ANGINA PECTORIS (HCC): ICD-10-CM

## 2023-11-25 LAB
CHOLEST SERPL-MCNC: 151 MG/DL (ref 100–199)
FASTING STATUS PATIENT QL REPORTED: NORMAL
HDLC SERPL-MCNC: 61 MG/DL
LDLC SERPL CALC-MCNC: 81 MG/DL
TRIGL SERPL-MCNC: 47 MG/DL (ref 0–149)

## 2023-11-25 PROCEDURE — 36415 COLL VENOUS BLD VENIPUNCTURE: CPT

## 2023-11-25 PROCEDURE — 80061 LIPID PANEL: CPT

## 2023-11-29 ENCOUNTER — TELEPHONE (OUTPATIENT)
Dept: CARDIOLOGY | Facility: MEDICAL CENTER | Age: 58
End: 2023-11-29
Payer: COMMERCIAL

## 2023-11-29 ENCOUNTER — PATIENT MESSAGE (OUTPATIENT)
Dept: CARDIOLOGY | Facility: MEDICAL CENTER | Age: 58
End: 2023-11-29
Payer: COMMERCIAL

## 2023-11-29 DIAGNOSIS — I20.9 ANGINA PECTORIS (HCC): ICD-10-CM

## 2023-11-29 DIAGNOSIS — I25.119 CORONARY ARTERY DISEASE INVOLVING NATIVE CORONARY ARTERY OF NATIVE HEART WITH ANGINA PECTORIS (HCC): ICD-10-CM

## 2023-11-29 RX ORDER — ATORVASTATIN CALCIUM 80 MG/1
80 TABLET, FILM COATED ORAL NIGHTLY
Qty: 90 TABLET | Refills: 3 | Status: SHIPPED | OUTPATIENT
Start: 2023-11-29

## 2023-11-29 NOTE — TELEPHONE ENCOUNTER
----- Message from Adolfo Gonzalez M.D. sent at 11/27/2023  9:57 PM PST -----  Can you let him know that his LDL has improved, but I would like to see it lower. I would recommend increasing his atorvastatin to 80 mg nightly. If he has concerns we can discuss further at his follow-up appointment.

## 2023-11-29 NOTE — TELEPHONE ENCOUNTER
Called pt, [phone], to review BN recommendations.  unable to reach.  Left detail voicemail to return this call at their earliest convenience. Awaiting pt response.

## 2023-11-30 NOTE — PATIENT COMMUNICATION
Noted pt response.    Medication sent to preferred pharmacy as requested.    Replied to pt via MyChart regarding findings.

## 2023-12-01 ENCOUNTER — OFFICE VISIT (OUTPATIENT)
Dept: CARDIOLOGY | Facility: MEDICAL CENTER | Age: 58
End: 2023-12-01
Attending: INTERNAL MEDICINE
Payer: COMMERCIAL

## 2023-12-01 VITALS
DIASTOLIC BLOOD PRESSURE: 82 MMHG | RESPIRATION RATE: 16 BRPM | OXYGEN SATURATION: 99 % | WEIGHT: 161 LBS | BODY MASS INDEX: 22.54 KG/M2 | SYSTOLIC BLOOD PRESSURE: 124 MMHG | HEIGHT: 71 IN | HEART RATE: 54 BPM

## 2023-12-01 DIAGNOSIS — I25.82 CHRONIC TOTAL OCCLUSION OF CORONARY ARTERY: ICD-10-CM

## 2023-12-01 DIAGNOSIS — E78.00 PURE HYPERCHOLESTEROLEMIA: ICD-10-CM

## 2023-12-01 DIAGNOSIS — I25.119 CORONARY ARTERY DISEASE INVOLVING NATIVE CORONARY ARTERY OF NATIVE HEART WITH ANGINA PECTORIS (HCC): ICD-10-CM

## 2023-12-01 PROBLEM — E78.5 DYSLIPIDEMIA: Status: RESOLVED | Noted: 2023-07-05 | Resolved: 2023-12-01

## 2023-12-01 PROBLEM — I20.9 ANGINA PECTORIS (HCC): Status: RESOLVED | Noted: 2023-07-05 | Resolved: 2023-12-01

## 2023-12-01 PROCEDURE — 3074F SYST BP LT 130 MM HG: CPT | Performed by: INTERNAL MEDICINE

## 2023-12-01 PROCEDURE — 99211 OFF/OP EST MAY X REQ PHY/QHP: CPT | Performed by: INTERNAL MEDICINE

## 2023-12-01 PROCEDURE — 3079F DIAST BP 80-89 MM HG: CPT | Performed by: INTERNAL MEDICINE

## 2023-12-01 PROCEDURE — 99214 OFFICE O/P EST MOD 30 MIN: CPT | Performed by: INTERNAL MEDICINE

## 2023-12-01 ASSESSMENT — FIBROSIS 4 INDEX: FIB4 SCORE: 1.41

## 2023-12-01 NOTE — PROGRESS NOTES
CARDIOLOGY CLINIC NOTE      Date of Visit: 12/1/2023    Primary Care Provider: Pcp Pt States None    Patient Name: Isaias Coyle  YOB: 1965  MRN: [id number]     Reason for Visit:   Follow-up     Patient Story:   Isaias Coyle is a 58 year-old gentleman with a past medical history of hyperlipidemia and coronary artery disease with a known  of the proximal right coronary artery.  Briefly, he was previously a patient of Dr. Bonner.  He had a CTCA performed to evaluate typical anginal symptoms, which suggested a proximal to mid RCA .  Therefore, he underwent coronary angiography on 7/20/2023, which demonstrated a proximal RCA  as well as angiographically severe disease in the first and second diagonal branches.  He was subsequently started on isosorbide and metoprolol as well as atorvastatin with improvement in his symptoms.    At his last clinic visit, we discussed the risks and benefits of  intervention in detail.  At that time, he wished to further increase his activity levels to see if he was developing limiting symptoms.    Today, he reports that he has still been restricting himself from high levels of exertion because of his concern about causing a heart attack/cardiac arrest.  He has been tolerating his atorvastatin well, although he notes that prior to his most recent lipid panel he had been missing some doses.     Medications and Allergies:     Current Outpatient Medications   Medication Sig Dispense Refill    atorvastatin (LIPITOR) 80 MG tablet Take 1 Tablet by mouth every evening. 90 Tablet 3    isosorbide mononitrate SR (IMDUR) 30 MG TABLET SR 24 HR Take 1 Tablet by mouth every morning. 90 Tablet 3    metoprolol SR (TOPROL XL) 25 MG TABLET SR 24 HR Take 1 Tablet by mouth every evening. 90 Tablet 3    clobetasol (TEMOVATE) 0.05 % Ointment Apply 1 Application topically 2 times daily with meals as needed.      aspirin 81 MG EC tablet Take 1 Tablet by mouth every  day. 100 Tablet 3     No current facility-administered medications for this visit.     Allergies   Allergen Reactions    Morphine Vomiting and Nausea      Medical Decision Making:   # Coronary disease ( proximal RCA): I previously reviewed his coronary angiogram in detail and we had a long discussion regarding the indications for  intervention.  We specifically discussed that  intervention has not been shown to clearly reduce mortality risk for major MI risk over a short period of time, however, the time duration of studies on CTOs is limited and we do not have good data on the long-term effects of leaving 's untreated.  We further discussed that  intervention has been shown to improve symptoms and quality of life in patients with refractory symptoms despite maximally tolerated antianginal therapy.  We also discussed that, while still considered safe,  interventions are associated with a higher risk of serious/life-threatening complications, in particular, there is a high risk of coronary perforation, which can rarely result in fatal cardiac tamponade.    Today, I again reviewed the indications and risks of  intervention.  He wants to further push his exercise level to determine how symptomatic he really is.  For now, we will continue the metoprolol and isosorbide.  He was warned to stop any activities causing significant chest discomfort.    # Dyslipidemia: We discussed the importance of compliance with statin therapy, which he is tolerating.  Will repeat lipids in 4 months on full dose atorvastatin with a long-term goal LDL of at least less than 70 and preferably less than 55.    Follow Up  4 months     Cardiac Studies and Procedures:   Echocardiography  TTE (6/9/2023)  Normal left ventricular chamber size and wall thickness.  Normal left ventricular systolic function.  The left ventricular ejection fraction is visually estimated to be 55%.  Normal right ventricular size. Normal right  "ventricular systolic   function.  There is mild pulmonic insufficiency    Coronary Angiography/Cardiac Catheterization  CAG (7/20/2023)  The left main coronary artery is patent and bifurcates into the left anterior descending and left circumflex coronary arteries.  The left anterior descending coronary artery is a large, transapical vessel with proximal 40% disease prior to the takeoff of the first diagonal branch, mid luminal irregularities, and a long mid-distal 20% lesion within a mild myocardial bridge.  It supplies a moderate first diagonal branch with proximal 90% disease and a small second diagonal branch with proximal 70% disease.  The left circumflex coronary artery is a large, nondominant vessel with proximal luminal irregularities prior to supplying a large, bifurcating first obtuse marginal branch with no angiographically significant.  The ongoing AV groove vessel is small and supplies a small second obtuse marginal branch.  The right coronary artery is a large, dominant vessel that is chronically occluded proximally.  The distal vessel refills by septal and epicardial collaterals from the left anterior descending coronary artery and appears to be relatively disease-free with large posterolateral and posterior descending branches.    CT/MRI  CTCA (7/3/2023)  1.  Long segment proximal to mid dominant right coronary artery occlusion. There is reconstitution of flow in the mid right coronary artery which is patent distally. The occlusion starts about 8 mm from the ostium.  2.  Mixed plaque within the proximal and mid LAD which is mostly nonobstructive although there may be focal up to 50% stenosis. There is also a an obstructive plaque within the proximal second diagonal.  3.  No significant circumflex stenosis.     Vital Signs:   /82 (BP Location: Left arm, Patient Position: Sitting)   Pulse (!) 54   Resp 16   Ht 1.803 m (5' 11\")   Wt 73 kg (161 lb)   SpO2 99%    BP Readings from Last 4 " "Encounters:   12/01/23 124/82   08/29/23 (!) 120/92   07/20/23 130/65   07/03/23 121/81     Wt Readings from Last 4 Encounters:   12/01/23 73 kg (161 lb)   08/29/23 69.9 kg (154 lb)   07/20/23 70.5 kg (155 lb 6.8 oz)   06/08/23 64.9 kg (143 lb)     Body mass index is 22.45 kg/m².     Laboratories:   Lipids  Lab Results   Component Value Date/Time    LDL 81 11/25/2023 07:34 AM     (H) 06/28/2023 06:45 AM       Lab Results   Component Value Date/Time    HDL 61 11/25/2023 07:34 AM    HDL 60 06/28/2023 06:45 AM       Lab Results   Component Value Date/Time    TRIGLYCERIDE 47 11/25/2023 07:34 AM    TRIGLYCERIDE 81 06/28/2023 06:45 AM       Lab Results   Component Value Date/Time    CHOLSTRLTOT 151 11/25/2023 07:34 AM    CHOLSTRLTOT 200 (H) 06/28/2023 06:45 AM       No components found for: \"LPA\"      Chemistries  Lab Results   Component Value Date/Time    CREATININE 0.98 07/20/2023 07:30 AM    CREATININE 1.17 06/28/2023 06:45 AM    CREATININE 1.23 02/27/2019 02:08 PM     Lab Results   Component Value Date/Time    BUN 22 07/20/2023 07:30 AM    BUN 23 (H) 06/28/2023 06:45 AM    BUN 15 02/27/2019 02:08 PM     Lab Results   Component Value Date/Time    POTASSIUM 4.2 07/20/2023 07:30 AM    POTASSIUM 4.4 06/28/2023 06:45 AM    POTASSIUM 3.4 (L) 02/27/2019 02:08 PM     Lab Results   Component Value Date/Time    SODIUM 141 07/20/2023 07:30 AM    SODIUM 142 06/28/2023 06:45 AM    SODIUM 141 02/27/2019 02:08 PM     Lab Results   Component Value Date/Time    GLUCOSE 94 07/20/2023 07:30 AM    GLUCOSE 103 (H) 06/28/2023 06:45 AM    GLUCOSE 121 (H) 02/27/2019 02:08 PM     Lab Results   Component Value Date/Time    ASTSGOT 31 07/20/2023 07:30 AM    ASTSGOT 26 06/28/2023 06:45 AM    ASTSGOT 48 (H) 02/27/2019 02:08 PM     Lab Results   Component Value Date/Time    ALTSGPT 28 07/20/2023 07:30 AM    ALTSGPT 28 06/28/2023 06:45 AM    ALTSGPT 92 (H) 02/27/2019 02:08 PM     Lab Results   Component Value Date/Time    ALKPHOSPHAT 56 " "07/20/2023 07:30 AM    ALKPHOSPHAT 57 06/28/2023 06:45 AM    ALKPHOSPHAT 65 02/27/2019 02:08 PM     Lab Results   Component Value Date/Time    HBA1C 5.0 06/28/2023 06:45 AM     No results found for: \"TSH\"  No results found for: \"NTPROBNP\"  No results found for: \"TROPONINT\"    Blood Counts  Lab Results   Component Value Date/Time    HEMOGLOBIN 13.9 (L) 07/20/2023 07:30 AM    HEMOGLOBIN 14.3 06/28/2023 06:45 AM    HEMOGLOBIN 15.6 02/27/2019 02:08 PM     Lab Results   Component Value Date/Time    PLATELETCT 237 07/20/2023 07:30 AM    PLATELETCT 236 06/28/2023 06:45 AM    PLATELETCT 273 02/27/2019 02:08 PM     Lab Results   Component Value Date/Time    WBC 5.9 07/20/2023 07:30 AM    WBC 5.4 06/28/2023 06:45 AM    WBC 6.4 02/27/2019 02:08 PM        Physical Examination:   General: Well-appearing, no acute distress  Eyes: Extraocular movements intact, anicteric  Neck: Full range of motion, no gross jugular venous distension  Pulmonary: Normal respiratory effort, no distress  Cardiovascular: Borderline bradycardic, regular  Gastrointestinal: Thin, nonobese  Extremities: Moves all extremities normally, no gross lower extremity edema, normal 2+ right radial pulse  Neurological: Alert and oriented, normal speech  Psychiatric: Normal affect, normal judgment     Past History:   Past Medical History  The patient's past medical history was reviewed.  See HPI and self-reported patient medical history form for pertinent medical history to consultation.    Past Social History  The patient's social history was reviewed.  See [facility] self-reported patient medical history form for pertinent social history to consultation.    Past Family History  The patient's family history was reviewed.  See [facility] self-reported patient medical history form for pertinent family history to consultation.    Review of Systems  A pertinent cardiac review of systems was performed and was otherwise unremarkable except as per HPI and self-reported patient medical " history form.        Adolfo Gonzalez MD, [facility]  Interventional Cardiology  [facility], [address]  [address] [address]  Phone: [phone]  Fax: [phone]

## 2024-04-01 ENCOUNTER — TELEPHONE (OUTPATIENT)
Dept: CARDIOLOGY | Facility: MEDICAL CENTER | Age: 59
End: 2024-04-01
Payer: COMMERCIAL

## 2024-04-01 NOTE — TELEPHONE ENCOUNTER
Attempted to call patient in regards to an upcoming appointment with . LVM letting patient know to call us back. Attempting to verify where lab work is done. Patient does have  lab work to do.      Pending call back.     BN 4/12/24

## 2024-04-09 ENCOUNTER — HOSPITAL ENCOUNTER (OUTPATIENT)
Dept: LAB | Facility: MEDICAL CENTER | Age: 59
End: 2024-04-09
Attending: INTERNAL MEDICINE
Payer: COMMERCIAL

## 2024-04-09 DIAGNOSIS — E78.00 PURE HYPERCHOLESTEROLEMIA: ICD-10-CM

## 2024-04-09 LAB
CHOLEST SERPL-MCNC: 148 MG/DL (ref 100–199)
FASTING STATUS PATIENT QL REPORTED: NORMAL
HDLC SERPL-MCNC: 63 MG/DL
LDLC SERPL CALC-MCNC: 75 MG/DL
TRIGL SERPL-MCNC: 52 MG/DL (ref 0–149)

## 2024-04-09 PROCEDURE — 80061 LIPID PANEL: CPT

## 2024-04-09 PROCEDURE — 36415 COLL VENOUS BLD VENIPUNCTURE: CPT

## 2024-04-12 ENCOUNTER — TELEPHONE (OUTPATIENT)
Dept: CARDIOLOGY | Facility: MEDICAL CENTER | Age: 59
End: 2024-04-12

## 2024-04-12 ENCOUNTER — OFFICE VISIT (OUTPATIENT)
Dept: CARDIOLOGY | Facility: MEDICAL CENTER | Age: 59
End: 2024-04-12
Attending: INTERNAL MEDICINE
Payer: COMMERCIAL

## 2024-04-12 VITALS
BODY MASS INDEX: 22.26 KG/M2 | RESPIRATION RATE: 16 BRPM | OXYGEN SATURATION: 95 % | WEIGHT: 159 LBS | HEIGHT: 71 IN | DIASTOLIC BLOOD PRESSURE: 74 MMHG | SYSTOLIC BLOOD PRESSURE: 112 MMHG | HEART RATE: 58 BPM

## 2024-04-12 DIAGNOSIS — I20.9 ANGINA PECTORIS (HCC): ICD-10-CM

## 2024-04-12 DIAGNOSIS — E78.00 PURE HYPERCHOLESTEROLEMIA: ICD-10-CM

## 2024-04-12 DIAGNOSIS — I20.89 STABLE ANGINA (HCC): ICD-10-CM

## 2024-04-12 DIAGNOSIS — E78.5 DYSLIPIDEMIA: ICD-10-CM

## 2024-04-12 PROCEDURE — 99214 OFFICE O/P EST MOD 30 MIN: CPT | Performed by: INTERNAL MEDICINE

## 2024-04-12 PROCEDURE — 99212 OFFICE O/P EST SF 10 MIN: CPT | Performed by: INTERNAL MEDICINE

## 2024-04-12 PROCEDURE — 3078F DIAST BP <80 MM HG: CPT | Performed by: INTERNAL MEDICINE

## 2024-04-12 PROCEDURE — 3074F SYST BP LT 130 MM HG: CPT | Performed by: INTERNAL MEDICINE

## 2024-04-12 RX ORDER — AMLODIPINE BESYLATE 2.5 MG/1
2.5 TABLET ORAL DAILY
Qty: 90 TABLET | Refills: 1 | Status: SHIPPED | OUTPATIENT
Start: 2024-04-12

## 2024-04-12 ASSESSMENT — FIBROSIS 4 INDEX: FIB4 SCORE: 1.41

## 2024-04-12 NOTE — TELEPHONE ENCOUNTER
Received [employer] request via [employer]  for REPATHA 140 MG/ML Solution Auto-injector SubQ injection pen. (Quantity:2, Day Supply:28)     Insurance: YULISSA   Member ID:  [id number]  BIN: [id number]  PCN: [id number]  Group: [id number]     Ran Test claim via [place] & medication Rejects stating prior authorization is required.

## 2024-04-12 NOTE — PATIENT INSTRUCTIONS
Start Repatha injections every two weeks  After starting, cut your atorvastatin in half  Stop isosorbide 30 mg daily  Start amlodipine 2.5 mg daily  Follow up in 4 months with repeat lipids

## 2024-04-12 NOTE — PROGRESS NOTES
CARDIOLOGY CLINIC NOTE      Date of Visit: 4/12/2024    Primary Care Provider: Pcp Pt States None    Patient Name: Isaias Coyle  YOB: 1965  MRN: [id number]     Reason for Visit:   Follow-up     Patient Story:   Isaias Coyle is a 58 year-old gentleman with a past medical history of hyperlipidemia and coronary artery disease with a known  of the proximal right coronary artery.  Briefly, he was previously a patient of Dr. Bonner.  He had a CTCA performed to evaluate typical anginal symptoms, which suggested a proximal to mid RCA .  Therefore, he underwent coronary angiography on 7/20/2023, which demonstrated a proximal RCA  as well as angiographically severe disease in the first and second diagonal branches.  He was subsequently started on isosorbide and metoprolol as well as atorvastatin with improvement in his symptoms.    At his initial clinic visit and in follow-up, we discussed the risks and benefits of  intervention in detail.  He wished to further increase his activity levels to see if he was developing limiting symptoms.    Today, he notes on metoprolol and isosorbide, he has been able to exercise at reasonably high levels without significant chest pain.  He only occasionally has discomfort with really high levels of exertion and he is overall satisfied with his quality of life.     Medications and Allergies:     Current Outpatient Medications   Medication Sig Dispense Refill    atorvastatin (LIPITOR) 80 MG tablet Take 1 Tablet by mouth every evening. 90 Tablet 3    isosorbide mononitrate SR (IMDUR) 30 MG TABLET SR 24 HR Take 1 Tablet by mouth every morning. 90 Tablet 3    metoprolol SR (TOPROL XL) 25 MG TABLET SR 24 HR Take 1 Tablet by mouth every evening. 90 Tablet 3    clobetasol (TEMOVATE) 0.05 % Ointment Apply 1 Application topically 2 times daily with meals as needed.      aspirin 81 MG EC tablet Take 1 Tablet by mouth every day. 100 Tablet 3     No current  facility-administered medications for this visit.     Allergies   Allergen Reactions    Morphine Vomiting and Nausea      Medical Decision Making:   # Coronary disease ( proximal RCA): I previously reviewed his coronary angiogram in detail and we had a long discussion regarding the indications for  intervention.  We specifically discussed that  intervention has not been shown to clearly reduce mortality risk for major MI risk over a short period of time, however, the time duration of studies on CTOs is limited and we do not have good data on the long-term effects of leaving 's untreated.  We further discussed that  intervention has been shown to improve symptoms and quality of life in patients with refractory symptoms despite maximally tolerated antianginal therapy.  We also discussed that, while still considered safe,  interventions are associated with a higher risk of serious/life-threatening complications, in particular, there is a high risk of coronary perforation, which can rarely result in fatal cardiac tamponade.  Today, I again reviewed the indications and risks of  intervention.  At this time, he feels that his quality of life is not significantly impacted and he is not interested in an invasive procedure that carries potential serious risks.  -Continue aspirin 81 mg daily  -Continue metoprolol XL 25 mg daily  -Given potential long-term adverse remodeling effects of isosorbide, will trial amlodipine 2.5 mg daily  -He was instructed to call the office if he develops significant lower extremity swelling or worsening chest discomfort after making this change  -Surveillance echocardiogram after next visit    # Hyperlipidemia: His LDL has remained above goal on maximum dose statin therapy.  Long-term, I would recommend a goal LDL at least less than 55 and preferably in the 40s.  He is unlikely to achieve this LDL goal with addition of ezetimibe, therefore, I recommended transitioning to a  PCSK9 inhibitor.  This would also allow a reduction in his statin dose to avoid long-term risk of side effects.  -Start evolocumab 140 mg every 2 weeks  -Reduce atorvastatin to 40 mg nightly after starting evolocumab  -Continue healthy dietary and exercise habits    Follow Up  4 months with repeat lipids with LP(a)     Cardiac Studies and Procedures:   Echocardiography  TTE (6/9/2023)  Normal left ventricular chamber size and wall thickness.  Normal left ventricular systolic function.  The left ventricular ejection fraction is visually estimated to be 55%.  Normal right ventricular size and systolic   function.  Mild pulmonic insufficiency    Coronary Angiography/Cardiac Catheterization  CAG (7/20/2023)  The left main coronary artery is patent and bifurcates into the left anterior descending and left circumflex coronary arteries.  The left anterior descending coronary artery is a large, transapical vessel with proximal 40% disease prior to the takeoff of the first diagonal branch, mid luminal irregularities, and a long mid-distal 20% lesion within a mild myocardial bridge.  It supplies a moderate first diagonal branch with proximal 90% disease and a small second diagonal branch with proximal 70% disease.  The left circumflex coronary artery is a large, nondominant vessel with proximal luminal irregularities prior to supplying a large, bifurcating first obtuse marginal branch with no angiographically significant.  The ongoing AV groove vessel is small and supplies a small second obtuse marginal branch.  The right coronary artery is a large, dominant vessel that is chronically occluded proximally.  The distal vessel refills by septal and epicardial collaterals from the left anterior descending coronary artery and appears to be relatively disease-free with large posterolateral and posterior descending branches.    CT/MRI  CTCA (7/3/2023)  1.  Long segment proximal to mid dominant right coronary artery occlusion. There  "is reconstitution of flow in the mid right coronary artery which is patent distally. The occlusion starts about 8 mm from the ostium.  2.  Mixed plaque within the proximal and mid LAD which is mostly nonobstructive although there may be focal up to 50% stenosis. There is also a an obstructive plaque within the proximal second diagonal.  3.  No significant circumflex stenosis.     Vital Signs:   There were no vitals taken for this visit.   BP Readings from Last 4 Encounters:   12/01/23 124/82   08/29/23 (!) 120/92   07/20/23 130/65   07/03/23 121/81     Wt Readings from Last 4 Encounters:   12/01/23 73 kg (161 lb)   08/29/23 69.9 kg (154 lb)   07/20/23 70.5 kg (155 lb 6.8 oz)   06/08/23 64.9 kg (143 lb)     There is no height or weight on file to calculate BMI.     Laboratories:   Lipids  Lab Results   Component Value Date/Time    LDL 75 04/09/2024 07:27 AM    LDL 81 11/25/2023 07:34 AM     (H) 06/28/2023 06:45 AM       Lab Results   Component Value Date/Time    HDL 63 04/09/2024 07:27 AM    HDL 61 11/25/2023 07:34 AM    HDL 60 06/28/2023 06:45 AM       Lab Results   Component Value Date/Time    TRIGLYCERIDE 52 04/09/2024 07:27 AM    TRIGLYCERIDE 47 11/25/2023 07:34 AM    TRIGLYCERIDE 81 06/28/2023 06:45 AM       Lab Results   Component Value Date/Time    CHOLSTRLTOT 148 04/09/2024 07:27 AM    CHOLSTRLTOT 151 11/25/2023 07:34 AM    CHOLSTRLTOT 200 (H) 06/28/2023 06:45 AM       No components found for: \"LPA\"      Chemistries  Lab Results   Component Value Date/Time    CREATININE 0.98 07/20/2023 07:30 AM    CREATININE 1.17 06/28/2023 06:45 AM    CREATININE 1.23 02/27/2019 02:08 PM     Lab Results   Component Value Date/Time    BUN 22 07/20/2023 07:30 AM    BUN 23 (H) 06/28/2023 06:45 AM    BUN 15 02/27/2019 02:08 PM     Lab Results   Component Value Date/Time    POTASSIUM 4.2 07/20/2023 07:30 AM    POTASSIUM 4.4 06/28/2023 06:45 AM    POTASSIUM 3.4 (L) 02/27/2019 02:08 PM     Lab Results   Component Value " "Date/Time    SODIUM 141 07/20/2023 07:30 AM    SODIUM 142 06/28/2023 06:45 AM    SODIUM 141 02/27/2019 02:08 PM     Lab Results   Component Value Date/Time    GLUCOSE 94 07/20/2023 07:30 AM    GLUCOSE 103 (H) 06/28/2023 06:45 AM    GLUCOSE 121 (H) 02/27/2019 02:08 PM     Lab Results   Component Value Date/Time    ASTSGOT 31 07/20/2023 07:30 AM    ASTSGOT 26 06/28/2023 06:45 AM    ASTSGOT 48 (H) 02/27/2019 02:08 PM     Lab Results   Component Value Date/Time    ALTSGPT 28 07/20/2023 07:30 AM    ALTSGPT 28 06/28/2023 06:45 AM    ALTSGPT 92 (H) 02/27/2019 02:08 PM     Lab Results   Component Value Date/Time    ALKPHOSPHAT 56 07/20/2023 07:30 AM    ALKPHOSPHAT 57 06/28/2023 06:45 AM    ALKPHOSPHAT 65 02/27/2019 02:08 PM     Lab Results   Component Value Date/Time    HBA1C 5.0 06/28/2023 06:45 AM     No results found for: \"TSH\"  No results found for: \"NTPROBNP\"  No results found for: \"TROPONINT\"    Blood Counts  Lab Results   Component Value Date/Time    HEMOGLOBIN 13.9 (L) 07/20/2023 07:30 AM    HEMOGLOBIN 14.3 06/28/2023 06:45 AM    HEMOGLOBIN 15.6 02/27/2019 02:08 PM     Lab Results   Component Value Date/Time    PLATELETCT 237 07/20/2023 07:30 AM    PLATELETCT 236 06/28/2023 06:45 AM    PLATELETCT 273 02/27/2019 02:08 PM     Lab Results   Component Value Date/Time    WBC 5.9 07/20/2023 07:30 AM    WBC 5.4 06/28/2023 06:45 AM    WBC 6.4 02/27/2019 02:08 PM        Physical Examination:   General: Well-appearing, no acute distress  Eyes: Extraocular movements intact, anicteric  Neck: Full range of motion, no gross jugular venous distension  Pulmonary: Normal respiratory effort, no distress  Cardiovascular: Borderline bradycardic, regular  Gastrointestinal: Thin, nonobese  Extremities: Moves all extremities normally, no gross lower extremity edema, normal 2+ right radial pulse  Neurological: Alert and oriented, normal speech  Psychiatric: Normal affect, normal judgment     Past History:   Past Medical History  The " patient's past medical history was reviewed.  See HPI and self-reported patient medical history form for pertinent medical history to consultation.    Past Social History  The patient's social history was reviewed.  See HPI self-reported patient medical history form for pertinent social history to consultation.    Past Family History  The patient's family history was reviewed.  See HPI self-reported patient medical history form for pertinent family history to consultation.    Review of Systems  A pertinent cardiac review of systems was performed and was otherwise unremarkable except as per HPI and self-reported patient medical history form.        Adolfo Gonzalez MD, [facility]  Interventional Cardiology  [facility], [address]  [address] [address]  Phone: [phone]  Fax: [phone]

## 2024-04-12 NOTE — TELEPHONE ENCOUNTER
Prior Authorization for REPATHA) 140 MG/ML Solution Auto-injector SubQ injection pen (Quantity: 2, Days: 28) has been submitted via Cover My Meds: Key ([id number])    Insurance: YULISSA    Will follow up in 24-48 business hours.

## 2024-06-10 ENCOUNTER — PATIENT MESSAGE (OUTPATIENT)
Dept: CARDIOLOGY | Facility: MEDICAL CENTER | Age: 59
End: 2024-06-10
Payer: COMMERCIAL

## 2024-06-11 ENCOUNTER — PATIENT MESSAGE (OUTPATIENT)
Dept: CARDIOLOGY | Facility: MEDICAL CENTER | Age: 59
End: 2024-06-11
Payer: COMMERCIAL

## 2024-06-11 NOTE — PATIENT COMMUNICATION
"Upon chart review per BN last OV note, \"-Given potential long-term adverse remodeling effects of isosorbide, will trial amlodipine 2.5 mg daily.\"    Replied to pt via MyChart regarding findings, awaiting pt response.    "

## 2024-06-12 NOTE — PATIENT COMMUNICATION
Noted pt response    Replied to pt via [employer] regarding recommendations, see encounter.

## 2024-08-31 DIAGNOSIS — E78.5 DYSLIPIDEMIA: ICD-10-CM

## 2024-09-03 RX ORDER — EVOLOCUMAB 140 MG/ML
140 INJECTION, SOLUTION SUBCUTANEOUS
Qty: 6 ML | Refills: 3 | Status: SHIPPED | OUTPATIENT
Start: 2024-09-03

## 2024-09-12 DIAGNOSIS — I20.9 ANGINA PECTORIS (HCC): ICD-10-CM

## 2024-09-13 RX ORDER — METOPROLOL SUCCINATE 25 MG/1
25 TABLET, EXTENDED RELEASE ORAL EVERY EVENING
Qty: 90 TABLET | Refills: 1 | Status: SHIPPED | OUTPATIENT
Start: 2024-09-13

## 2024-09-13 NOTE — TELEPHONE ENCOUNTER
Is the patient due for a refill? Yes    Was the patient seen the past year? Yes    Date of last office visit: 4/12/24    Does the patient have an upcoming appointment?  Yes   If yes, When? 10/22/24    Provider to refill:BN    Does the patient have assisted Plus and need 100-day supply? (This applies to ALL medications) Patient does not have SCP

## 2024-10-10 ENCOUNTER — TELEPHONE (OUTPATIENT)
Dept: CARDIOLOGY | Facility: MEDICAL CENTER | Age: 59
End: 2024-10-10
Payer: COMMERCIAL

## 2024-12-04 DIAGNOSIS — I20.9 ANGINA PECTORIS (HCC): ICD-10-CM

## 2024-12-06 RX ORDER — AMLODIPINE BESYLATE 2.5 MG/1
2.5 TABLET ORAL DAILY
Qty: 90 TABLET | Refills: 1 | Status: SHIPPED | OUTPATIENT
Start: 2024-12-06

## 2024-12-06 NOTE — TELEPHONE ENCOUNTER
Is the patient due for a refill? Yes    Was the patient seen the past year? Yes    Date of last office visit: 04.12.2024    Does the patient have an upcoming appointment?  Yes   If yes, When? 01.10.2025    Provider to refill:BN    Does the patient have assisted Plus and need 100-day supply? (This applies to ALL medications) Patient does not have SCP

## 2024-12-30 ENCOUNTER — TELEPHONE (OUTPATIENT)
Dept: CARDIOLOGY | Facility: MEDICAL CENTER | Age: 59
End: 2024-12-30
Payer: COMMERCIAL

## 2024-12-30 NOTE — TELEPHONE ENCOUNTER
Chart prep:      Attempted to call patient in regards to an upcoming appointment with Dr. Gonzalez 1/10/25. LVM letting patient know to call us back. Attempting to verify where lab work is done. Patient does have  lab work to do.      Pending call back.

## 2025-01-07 ENCOUNTER — HOSPITAL ENCOUNTER (OUTPATIENT)
Dept: LAB | Facility: MEDICAL CENTER | Age: 60
End: 2025-01-07
Attending: INTERNAL MEDICINE
Payer: COMMERCIAL

## 2025-01-07 DIAGNOSIS — E78.5 DYSLIPIDEMIA: ICD-10-CM

## 2025-01-07 LAB
CHOLEST SERPL-MCNC: 105 MG/DL (ref 100–199)
FASTING STATUS PATIENT QL REPORTED: NORMAL
HDLC SERPL-MCNC: 70 MG/DL
LDLC SERPL CALC-MCNC: 25 MG/DL
TRIGL SERPL-MCNC: 49 MG/DL (ref 0–149)

## 2025-01-07 PROCEDURE — 83695 ASSAY OF LIPOPROTEIN(A): CPT

## 2025-01-07 PROCEDURE — 36415 COLL VENOUS BLD VENIPUNCTURE: CPT

## 2025-01-07 PROCEDURE — 80061 LIPID PANEL: CPT

## 2025-01-09 LAB — LPA SERPL-MCNC: 61 MG/DL

## 2025-01-17 ENCOUNTER — OFFICE VISIT (OUTPATIENT)
Dept: CARDIOLOGY | Facility: MEDICAL CENTER | Age: 60
End: 2025-01-17
Attending: INTERNAL MEDICINE
Payer: COMMERCIAL

## 2025-01-17 VITALS
RESPIRATION RATE: 16 BRPM | HEIGHT: 71 IN | SYSTOLIC BLOOD PRESSURE: 130 MMHG | WEIGHT: 153 LBS | HEART RATE: 99 BPM | OXYGEN SATURATION: 99 % | DIASTOLIC BLOOD PRESSURE: 100 MMHG | BODY MASS INDEX: 21.42 KG/M2

## 2025-01-17 DIAGNOSIS — E78.00 PURE HYPERCHOLESTEROLEMIA: ICD-10-CM

## 2025-01-17 DIAGNOSIS — I25.82 CHRONIC TOTAL OCCLUSION OF CORONARY ARTERY: ICD-10-CM

## 2025-01-17 DIAGNOSIS — E78.5 DYSLIPIDEMIA: ICD-10-CM

## 2025-01-17 DIAGNOSIS — I20.89 STABLE ANGINA (HCC): ICD-10-CM

## 2025-01-17 PROBLEM — I25.83 CORONARY ARTERY DISEASE DUE TO LIPID RICH PLAQUE: Status: ACTIVE | Noted: 2023-07-05

## 2025-01-17 PROBLEM — I25.119 CORONARY ARTERY DISEASE INVOLVING NATIVE CORONARY ARTERY OF NATIVE HEART WITH ANGINA PECTORIS (HCC): Status: RESOLVED | Noted: 2023-07-05 | Resolved: 2025-01-17

## 2025-01-17 PROBLEM — I25.10 CORONARY ARTERY DISEASE DUE TO LIPID RICH PLAQUE: Status: ACTIVE | Noted: 2023-07-05

## 2025-01-17 PROCEDURE — 99212 OFFICE O/P EST SF 10 MIN: CPT | Performed by: INTERNAL MEDICINE

## 2025-01-17 PROCEDURE — 3075F SYST BP GE 130 - 139MM HG: CPT | Performed by: INTERNAL MEDICINE

## 2025-01-17 PROCEDURE — 99214 OFFICE O/P EST MOD 30 MIN: CPT | Performed by: INTERNAL MEDICINE

## 2025-01-17 PROCEDURE — 3080F DIAST BP >= 90 MM HG: CPT | Performed by: INTERNAL MEDICINE

## 2025-01-17 RX ORDER — ATORVASTATIN CALCIUM 20 MG/1
20 TABLET, FILM COATED ORAL NIGHTLY
Qty: 100 TABLET | Refills: 3 | Status: SHIPPED | OUTPATIENT
Start: 2025-01-17 | End: 2026-02-21

## 2025-01-17 ASSESSMENT — FIBROSIS 4 INDEX: FIB4 SCORE: 1.43

## 2025-01-17 NOTE — PROGRESS NOTES
CARDIOLOGY CLINIC NOTE      Date of Visit: 1/17/2025    Primary Care Provider: Pcp Pt States None    Patient Name: Isaias Coyle  YOB: 1965  MRN: [id number]     Reason for Visit:   Follow-up     Patient Story:   Isaias Coyle is a 58 year-old gentleman with a past medical history of hyperlipidemia and coronary artery disease with a known  of the proximal right coronary artery.  Briefly, he was previously a patient of Dr. Bonner.  He had a CTCA performed to evaluate typical anginal symptoms, which suggested a proximal to mid RCA .  Therefore, he underwent coronary angiography on 7/20/2023, which demonstrated a proximal RCA  as well as angiographically severe disease in the first and second diagonal branches.  He was subsequently started on isosorbide and metoprolol as well as atorvastatin with improvement in his symptoms.    At his initial clinic visit and in follow-up, we discussed the risks and benefits of  intervention in detail.  He wished to further increase his activity levels to see if he was developing limiting symptoms.    In follow-up, he reported minimal chest pain symptoms on metoprolol and isosorbide.  Given the potential for adverse long-term remodeling with isosorbide, he was changed to amlodipine.  His LDL remained uncontrolled and he was started evolocumab with an imporvement to 25.    Today, he notes that he has not had any significant lower extremity edema after starting amlodipine, however, a few months ago he developed very mild tinnitus.  He continues to exercise at very high levels with only rare episodes of chest pain.  He is able to skin[place]up mountains when backcountry snowboarding without pain.     Medications and Allergies:     Current Outpatient Medications   Medication Sig Dispense Refill    atorvastatin (LIPITOR) 20 MG Tab Take 1 Tablet by mouth every evening. 100 Tablet 3    amLODIPine (NORVASC) 2.5 MG Tab Take 1 tablet by mouth once daily 90  Tablet 1    metoprolol SR (TOPROL XL) 25 MG TABLET SR 24 HR TAKE 1 TABLET BY MOUTH ONCE DAILY IN THE EVENING 90 Tablet 1    Evolocumab (REPATHA SURECLICK) 140 MG/ML Solution Auto-injector SubQ injection pen Inject 1 mL under the skin every 14 days. 6 mL 3    sertraline (ZOLOFT) 50 MG Tab Take 50 mg by mouth every day.      clobetasol (TEMOVATE) 0.05 % Ointment Apply 1 Application topically 2 times daily with meals as needed.      aspirin 81 MG EC tablet Take 1 Tablet by mouth every day. 100 Tablet 3     No current facility-administered medications for this visit.     Allergies   Allergen Reactions    Morphine Vomiting and Nausea      Medical Decision Making:   # Coronary disease ( proximal RCA): I previously reviewed his coronary angiogram in detail and we had a long discussion regarding the indications for  intervention.  We specifically discussed that  intervention has not been shown to clearly reduce mortality risk for major MI risk over a short period of time, however, the time duration of studies on CTOs is limited and we do not have good data on the long-term effects of leaving 's untreated.  We further discussed that  intervention has been shown to improve symptoms and quality of life in patients with refractory symptoms despite maximally tolerated antianginal therapy.  We also discussed that, while still considered safe,  interventions are associated with a higher risk of serious/life-threatening complications, in particular, there is a high risk of coronary perforation, which can rarely result in fatal cardiac tamponade.  We again reviewed the indications and risks of  intervention today.  He continues to be able to exercise at high levels without significant symptoms and feels his quality of life is not significantly impacted.  He continues to be uninterested in an invasive procedure that carries potential serious risks at this time.  -Continue aspirin 81 mg daily  -Continue metoprolol  XL 25 mg daily  -Will hold amlodipine to see if tinnitus improves  -Surveillance echocardiogram after next visit    # Hyperlipidemia: Long-term, I would recommend a goal LDL at least less than 55 and preferably in the 40s given his age and future risk for ASCVD events.    -Continue evolocumab 140 mg every 2 weeks  -Reduce atorvastatin to 20 mg nightly  -Repeat lipids prior to follow-up    Follow Up  6 months with repeat lipids     Cardiac Studies and Procedures:   Echocardiography  TTE (6/9/2023)  Normal left ventricular chamber size and wall thickness.  Normal left ventricular systolic function.  The left ventricular ejection fraction is visually estimated to be 55%.  Normal right ventricular size and systolic   function.  Mild pulmonic insufficiency    Coronary Angiography/Cardiac Catheterization  CAG (7/20/2023)  The left main coronary artery is patent and bifurcates into the left anterior descending and left circumflex coronary arteries.  The left anterior descending coronary artery is a large, transapical vessel with proximal 40% disease prior to the takeoff of the first diagonal branch, mid luminal irregularities, and a long mid-distal 20% lesion within a mild myocardial bridge.  It supplies a moderate first diagonal branch with proximal 90% disease and a small second diagonal branch with proximal 70% disease.  The left circumflex coronary artery is a large, nondominant vessel with proximal luminal irregularities prior to supplying a large, bifurcating first obtuse marginal branch with no angiographically significant.  The ongoing AV groove vessel is small and supplies a small second obtuse marginal branch.  The right coronary artery is a large, dominant vessel that is chronically occluded proximally.  The distal vessel refills by septal and epicardial collaterals from the left anterior descending coronary artery and appears to be relatively disease-free with large posterolateral and posterior descending  "branches.    CT/MRI  CTCA (7/3/2023)  1.  Long segment proximal to mid dominant right coronary artery occlusion. There is reconstitution of flow in the mid right coronary artery which is patent distally. The occlusion starts about 8 mm from the ostium.  2.  Mixed plaque within the proximal and mid LAD which is mostly nonobstructive although there may be focal up to 50% stenosis. There is also a an obstructive plaque within the proximal second diagonal.  3.  No significant circumflex stenosis.     Vital Signs:   BP (!) 130/100 (BP Location: Left arm, Patient Position: Sitting)   Pulse 99   Resp 16   Ht 1.803 m (5' 11\")   Wt 69.4 kg (153 lb)   SpO2 99%    BP Readings from Last 4 Encounters:   01/17/25 (!) 130/100   04/12/24 112/74   12/01/23 124/82   08/29/23 (!) 120/92     Wt Readings from Last 4 Encounters:   01/17/25 69.4 kg (153 lb)   04/12/24 72.1 kg (159 lb)   12/01/23 73 kg (161 lb)   08/29/23 69.9 kg (154 lb)     Body mass index is 21.34 kg/m².     Laboratories:   Lipids  Lab Results   Component Value Date/Time    LDL 25 01/07/2025 06:41 AM    LDL 75 04/09/2024 07:27 AM    LDL 81 11/25/2023 07:34 AM     (H) 06/28/2023 06:45 AM       Lab Results   Component Value Date/Time    HDL 70 01/07/2025 06:41 AM    HDL 63 04/09/2024 07:27 AM    HDL 61 11/25/2023 07:34 AM    HDL 60 06/28/2023 06:45 AM       Lab Results   Component Value Date/Time    TRIGLYCERIDE 49 01/07/2025 06:41 AM    TRIGLYCERIDE 52 04/09/2024 07:27 AM    TRIGLYCERIDE 47 11/25/2023 07:34 AM    TRIGLYCERIDE 81 06/28/2023 06:45 AM       Lab Results   Component Value Date/Time    CHOLSTRLTOT 105 01/07/2025 06:41 AM    CHOLSTRLTOT 148 04/09/2024 07:27 AM    CHOLSTRLTOT 151 11/25/2023 07:34 AM    CHOLSTRLTOT 200 (H) 06/28/2023 06:45 AM       No components found for: \"LPA\"      Chemistries  Lab Results   Component Value Date/Time    CREATININE 0.98 07/20/2023 07:30 AM    CREATININE 1.17 06/28/2023 06:45 AM    CREATININE 1.23 02/27/2019 02:08 PM " "    Lab Results   Component Value Date/Time    BUN 22 07/20/2023 07:30 AM    BUN 23 (H) 06/28/2023 06:45 AM    BUN 15 02/27/2019 02:08 PM     Lab Results   Component Value Date/Time    POTASSIUM 4.2 07/20/2023 07:30 AM    POTASSIUM 4.4 06/28/2023 06:45 AM    POTASSIUM 3.4 (L) 02/27/2019 02:08 PM     Lab Results   Component Value Date/Time    SODIUM 141 07/20/2023 07:30 AM    SODIUM 142 06/28/2023 06:45 AM    SODIUM 141 02/27/2019 02:08 PM     Lab Results   Component Value Date/Time    GLUCOSE 94 07/20/2023 07:30 AM    GLUCOSE 103 (H) 06/28/2023 06:45 AM    GLUCOSE 121 (H) 02/27/2019 02:08 PM     Lab Results   Component Value Date/Time    ASTSGOT 31 07/20/2023 07:30 AM    ASTSGOT 26 06/28/2023 06:45 AM    ASTSGOT 48 (H) 02/27/2019 02:08 PM     Lab Results   Component Value Date/Time    ALTSGPT 28 07/20/2023 07:30 AM    ALTSGPT 28 06/28/2023 06:45 AM    ALTSGPT 92 (H) 02/27/2019 02:08 PM     Lab Results   Component Value Date/Time    ALKPHOSPHAT 56 07/20/2023 07:30 AM    ALKPHOSPHAT 57 06/28/2023 06:45 AM    ALKPHOSPHAT 65 02/27/2019 02:08 PM     Lab Results   Component Value Date/Time    HBA1C 5.0 06/28/2023 06:45 AM     No results found for: \"TSH\"  No results found for: \"NTPROBNP\"  No results found for: \"TROPONINT\"    Blood Counts  Lab Results   Component Value Date/Time    HEMOGLOBIN 13.9 (L) 07/20/2023 07:30 AM    HEMOGLOBIN 14.3 06/28/2023 06:45 AM    HEMOGLOBIN 15.6 02/27/2019 02:08 PM     Lab Results   Component Value Date/Time    PLATELETCT 237 07/20/2023 07:30 AM    PLATELETCT 236 06/28/2023 06:45 AM    PLATELETCT 273 02/27/2019 02:08 PM     Lab Results   Component Value Date/Time    WBC 5.9 07/20/2023 07:30 AM    WBC 5.4 06/28/2023 06:45 AM    WBC 6.4 02/27/2019 02:08 PM      Physical Examination:   General: Well-appearing, no acute distress  Eyes: Extraocular movements intact, anicteric  Neck: Full range of motion, no gross jugular venous distension  Pulmonary: Normal respiratory effort, no " distress  Cardiovascular: Borderline bradycardic, regular  Gastrointestinal: Thin, nonobese  Extremities: Moves all extremities normally, no gross lower extremity edema  Neurological: Alert and oriented, normal speech  Psychiatric: Normal affect, normal judgment     Past History:   Past Medical History  The patient's past medical history was reviewed.  See HPI and self-reported patient medical history form for pertinent medical history to consultation.    Past Social History  The patient's social history was reviewed.  See HPI self-reported patient medical history form for pertinent social history to consultation.    Past Family History  The patient's family history was reviewed.  See HPI self-reported patient medical history form for pertinent family history to consultation.    Review of Systems  A pertinent cardiac review of systems was performed and was otherwise unremarkable except as per HPI and self-reported patient medical history form.        Adolfo Gonzalez MD, [facility]  Interventional Cardiology  [facility], [address]  [address] [address]  Phone: [phone]  Fax: [phone]

## 2025-01-31 ENCOUNTER — PATIENT MESSAGE (OUTPATIENT)
Dept: CARDIOLOGY | Facility: MEDICAL CENTER | Age: 60
End: 2025-01-31
Payer: COMMERCIAL

## 2025-02-03 ENCOUNTER — PATIENT MESSAGE (OUTPATIENT)
Dept: CARDIOLOGY | Facility: MEDICAL CENTER | Age: 60
End: 2025-02-03
Payer: COMMERCIAL

## 2025-04-18 DIAGNOSIS — I20.9 ANGINA PECTORIS (HCC): ICD-10-CM

## 2025-04-18 NOTE — TELEPHONE ENCOUNTER
Is the patient due for a refill? Yes    Was the patient seen the last 15 months? Yes    Date of last office visit: 1/17/2025    Does the patient have an upcoming appointment?  Yes   If yes, When? 8/13/25    Provider to refill:BN    Does the patient have longterm Plus and need 100-day supply? (This applies to ALL medications) Patient does not have SCP

## 2025-04-22 RX ORDER — METOPROLOL SUCCINATE 25 MG/1
25 TABLET, EXTENDED RELEASE ORAL EVERY EVENING
Qty: 90 TABLET | Refills: 3 | Status: SHIPPED | OUTPATIENT
Start: 2025-04-22